# Patient Record
Sex: FEMALE | Race: WHITE
[De-identification: names, ages, dates, MRNs, and addresses within clinical notes are randomized per-mention and may not be internally consistent; named-entity substitution may affect disease eponyms.]

---

## 2017-10-10 ENCOUNTER — HOSPITAL ENCOUNTER (EMERGENCY)
Dept: HOSPITAL 45 - C.EDB | Age: 82
Discharge: HOME | End: 2017-10-10
Payer: COMMERCIAL

## 2017-10-10 VITALS — HEART RATE: 86 BPM | SYSTOLIC BLOOD PRESSURE: 156 MMHG | OXYGEN SATURATION: 98 % | DIASTOLIC BLOOD PRESSURE: 75 MMHG

## 2017-10-10 VITALS
HEIGHT: 60 IN | WEIGHT: 126.99 LBS | BODY MASS INDEX: 24.93 KG/M2 | HEIGHT: 60 IN | WEIGHT: 126.99 LBS | BODY MASS INDEX: 24.93 KG/M2

## 2017-10-10 VITALS — TEMPERATURE: 97.34 F

## 2017-10-10 DIAGNOSIS — I25.10: ICD-10-CM

## 2017-10-10 DIAGNOSIS — R10.31: ICD-10-CM

## 2017-10-10 DIAGNOSIS — F32.9: ICD-10-CM

## 2017-10-10 DIAGNOSIS — Z79.82: ICD-10-CM

## 2017-10-10 DIAGNOSIS — C91.11: ICD-10-CM

## 2017-10-10 DIAGNOSIS — M19.90: ICD-10-CM

## 2017-10-10 DIAGNOSIS — K40.90: Primary | ICD-10-CM

## 2017-10-10 LAB
ALP SERPL-CCNC: 115 U/L (ref 45–117)
ALT SERPL-CCNC: 18 U/L (ref 12–78)
ANION GAP SERPL CALC-SCNC: 7 MMOL/L (ref 3–11)
AST SERPL-CCNC: (no result) U/L (ref 15–37)
BASOPHILS # BLD: 0.08 K/UL (ref 0–0.2)
BASOPHILS NFR BLD: 0.2 %
BUN SERPL-MCNC: 16 MG/DL (ref 7–18)
BUN/CREAT SERPL: 12.2 (ref 10–20)
CALCIUM SERPL-MCNC: 9.4 MG/DL (ref 8.5–10.1)
CHLORIDE SERPL-SCNC: 106 MMOL/L (ref 98–107)
CKMB/CK RATIO: (no result) (ref 0–3)
CO2 SERPL-SCNC: 25 MMOL/L (ref 21–32)
COMPLETE: YES
CREAT CL PREDICTED SERPL C-G-VRATE: 24.7 ML/MIN
CREAT SERPL-MCNC: 1.3 MG/DL (ref 0.6–1.2)
EOSINOPHIL NFR BLD AUTO: 220 K/UL (ref 130–400)
GLUCOSE SERPL-MCNC: 83 MG/DL (ref 70–99)
HCT VFR BLD CALC: 41.3 % (ref 37–47)
IG%: 0.3 %
IMM GRANULOCYTES NFR BLD AUTO: 82.5 %
INR PPP: 1 (ref 0.9–1.1)
LYMPHOCYTES # BLD: 31.39 K/UL (ref 1.2–3.4)
MCH RBC QN AUTO: 29.5 PG (ref 25–34)
MCHC RBC AUTO-ENTMCNC: 32 G/DL (ref 32–36)
MCV RBC AUTO: 92.2 FL (ref 80–100)
MONOCYTES NFR BLD: 2.5 %
NEUTROPHILS # BLD AUTO: 1.5 %
NEUTROPHILS NFR BLD AUTO: 13 %
PARTIAL THROMBOPLASTIN RATIO: 0.9
PMV BLD AUTO: 9.2 FL (ref 7.4–10.4)
POTASSIUM SERPL-SCNC: (no result) MMOL/L (ref 3.5–5.1)
PROTHROMBIN TIME: 11.2 SECONDS (ref 9–12)
RBC # BLD AUTO: 4.48 M/UL (ref 4.2–5.4)
SMUDGE CELLS # BLD: PRESENT 10*3/UL
SODIUM SERPL-SCNC: 138 MMOL/L (ref 136–145)
WBC # BLD AUTO: 38.04 K/UL (ref 4.8–10.8)

## 2017-10-10 NOTE — EMERGENCY ROOM VISIT NOTE
History


Report prepared by Xuan:  Deyanira Russo


Under the Supervision of:  Dr. Sin Alfaro D.O.


First contact with patient:  19:26


Chief Complaint:  ABDOMINAL PAIN


Stated Complaint:  INGUINAL HERNIA


Nursing Triage Summary:  


pt dx with right inguinal hernia with bowel. last week saw dr do. had u/s 


yesterday. sent here for further eval





History of Present Illness


The patient is a 86 year old female who presents to the Emergency Room with 

complaints of a right inguinal hernia.  She reports that she saw Dr. Do 3 

days ago and was referred here. The patient states that she had an US done 

yesterday that showed bowel in the hernia.  The patient reports having nausea 

and back pain. The patient denies having diarrhea and vomiting. The patient 

reports having history of CLL, but denies a history of breast cancer. She 

reports taking medication for hypertension.





   Source of History:  patient


   Position:  other (right inguinal region)


   Quality:  other (hernia)


   Timing:  constant


   Associated Symptoms:  + nausea, + back pain, No vomiting, No diarrhea





Review of Systems


See HPI for pertinent positives & negatives. A total of 10 systems reviewed and 

were otherwise negative.





Past Medical & Surgical


Medical Problems:


(1) Acute Lyme disease


(2) Arthritis


(3) CAD (coronary artery disease)


(4) CLL (chronic lymphocytic leukemia)


(5) Degenerative disc disease


(6) Depression


(7) Non-allergic rhinitis


Surgical Problems:


(1) History of tonsillectomy








Family History





No pertinent family history





Social History


Smoking Status:  Never Smoker


Alcohol Use:  none


Drug Use:  none


Marital Status:  


Housing Status:  lives with significant other


Occupation Status:  retired





Current/Historical Medications


Scheduled


Aspirin (Aspirin), 81 MG PO QPM


Atorvastatin (Lipitor), 40 MG PO QPM


Azelastine Hcl (Astelin Nasal Council), 1 SPRAY JULIO BID


Bimatoprost (Lumigan), 1 DROP OPB HS


Calcium Carbonate-Vitamin D (Calcium 600 + D), 1 TABS PO QPM


Cholecalciferol (Vitamin D3), 1 TAB PO BID


Epoetin Enmanuel (Procrit), 1 ML INJ WK


Ferrous Sulfate (Ferrous Sulfate), 325 MG PO BID


Fluticasone Propionate (Nasal) (Flonase Allergy Relief), 2 SPRAYS JULIO DAILY 

AFTERNOON


Gabapentin (Neurontin), 300 MG PO HS


Lactobacillus-Inulin (Culturelle), 1 CAP PO QPM


Lamotrigine (Lamotrigine), 100 MG PO BID


Levothyroxine Sodium (Levothyroxine Sodium), 50 MCG PO QAM


Lisinopril (Zestril), 10 MG PO QPM


Lorazepam (Ativan), 0.25 MG PO HS


Metoprolol Succinate (Toprol Xl), 25 MG PO QPM


Mometasone Furoate-Formoterol (Dulera 100/5 Mcg), 2 PUFFS INH Q12


Omeprazole (Prilosec), 20 MG PO QPM


Quetiapine Fumarate (Seroquel), 25 MG PO HS


Venlafaxine Hcl (Effexor Xr), 75 MG PO QPM


Venlafaxine Hcl (Effexor Xr), 150 MG PO QPM





Scheduled PRN


Levalbuterol Tartrate (Xopenex Hfa), 2 PUFFS INH Q4H PRN for SOB/Wheezing/Cough


Nitroglycerin (Nitrostat), 0.4 MG UT UD PRN for Chest Pain





Allergies


Coded Allergies:  


     Adhesives (Verified  Allergy, Intermediate, if on for a while, 10/10/17)


     Bacitracin (Verified  Allergy, Mild, ALLERGY, 10/10/17)


     Neomycin (Verified  Allergy, Mild, ALLERGY, 10/10/17)


     Polymyxin B (Verified  Allergy, Mild, ALLERGY, 10/10/17)





Physical Exam


Vital Signs











  Date Time  Temp Pulse Resp B/P (MAP) Pulse Ox O2 Delivery O2 Flow Rate FiO2


 


10/10/17 23:06  86 18 156/75 98 Room Air  


 


10/10/17 21:16  79 18 151/72 97 Room Air  


 


10/10/17 18:12 36.3 109 18 151/76 99 Room Air  











Physical Exam


GENERAL:  Patient is awake, alert, and in no acute distress. Patient is resting 

comfortably and showing no signs of anxiety


EYES: The conjunctivae are clear.  The pupils are round and reactive. 


EARS, NOSE, MOUTH AND THROAT: The nose is without any evidence of any 

deformity. Mucous membranes are moist tongue is midline 


NECK: The neck is nontender and supple.


RESPIRATORY: Normal respiratory effort is noted there is no evidence of 

wheezing rhonchi or rales


CARDIOVASCULAR:  Regular rate and rhythm noted there no murmurs rubs or gallops 

normal S1 normal S2 


GASTROINTESTINAL: Abdomen mildly distended with right lower quadrant tenderness 

to palpation. No guarding or rigidity. No definite inguinal mass noted with 

tenderness to palpation. 


BACK:  No midline tenderness or or step-off noted range of motion in flexion 

extension as well as rotation no signs of muscle spasm noted


MUSCULOSKELETAL/EXTREMITIES: There is no evidence of gross deformity full range 

of motion is noted in the hips and shoulders


SKIN: There is no obvious evidence of any rash. There are no petechiae, pallor 

or cyanosis noted. 


NEUROLOGIC:  Patient is awake alert and oriented x3





Medical Decision & Procedures


ER Provider


Diagnostic Interpretation:


Radiology results as stated below per my review and radiologist interpretation:








CHEST ONE VIEW PORTABLE





CLINICAL HISTORY: Pain, radiating to the abdomen    





COMPARISON STUDY:  7/4/2016





FINDINGS: The cardiac and sternal contours remain stable. There is mild


elevation/eventration right hemidiaphragm unchanged the prior study. There is no


focal pulmonary consolidation. There is no failure. There are no pleural


effusions. There is minor thickening of interstitial markings unchanged the


prior study. There is no free intraperitoneal air.[ 





IMPRESSION: No active disease in the chest.











Electronically signed by:  Joshua Medina M.D.


10/10/2017 8:08 PM





Dictated Date/Time:  10/10/2017 8:07 PM








CT SCAN OF THE ABDOMEN AND PELVIS WITHOUT CONTRAST





CLINICAL HISTORY: right flank pain    





COMPARISON STUDY:  7/14/2010 





TECHNIQUE: CT scan of the abdomen and pelvis was performed from the lung bases


to the proximal femurs. Images are reviewed in the axial, sagittal, and coronal


planes. IV contrast was not administered for this examination.  A dose lowering


technique was utilized adhering to the principles of ALARA.








CT DOSE: 252.89 mGy.cm


FINDINGS:





Lower chest: There is a moderate hiatal hernia, with a para esophageal


component. There is bilateral subpleural reticulation. There is no lobar


consolidation.





Liver: The unenhanced liver is normal in size, contour, and attenuation. There


is no intrahepatic biliary ductal dilatation.





Gallbladder: Mildly distended. No calculi identified.





Spleen: Normal in size and attenuation.





Pancreas: Unremarkable.





Adrenal glands: Unremarkable.





Kidneys: No renal, ureteral, or bladder calculi are visualized.





Bowel: There are no transition zones indicate bowel obstruction. There is no


acute diverticulitis. There is a right inguinal hernia which contains small


bowel loops. This is not currently resulting in obstruction.





Peritoneum: There is no intraperitoneal free air or abdominal ascites.





Vasculature: The abdominal aorta is normal in course and caliber.





Adenopathy: None.





Pelvic viscera: There is an indwelling pessary.





Skeletal structures: No destructive osseous lesions are seen.





IMPRESSION:  


1. No evidence of bowel obstruction. No evidence of free air


2. Containing right inguinal hernia


3. Mild gallbladder distention. No calculi visualized on CT scanning


4. Hiatal hernia with a para esophageal component


5. No renal, ureteral, or bladder calculi identified.


6. Normal appendix











Electronically signed by:  Joshua Medina M.D.


10/10/2017 8:40 PM





Dictated Date/Time:  10/10/2017 8:36 PM





Laboratory Results


10/10/17 21:02








Red Blood Count 4.48, Mean Corpuscular Volume 92.2, Mean Corpuscular Hemoglobin 

29.5, Mean Corpuscular Hemoglobin Concent 32.0, Mean Platelet Volume 9.2, 

Neutrophils (%) (Auto) 13.0, Lymphocytes (%) (Auto) 82.5, Monocytes (%) (Auto) 

2.5, Eosinophils (%) (Auto) 1.5, Basophils (%) (Auto) 0.2, Neutrophils # (Auto) 

4.93, Lymphocytes # (Auto) 31.39, Monocytes # (Auto) 0.97, Eosinophils # (Auto) 

0.57, Basophils # (Auto) 0.08





10/10/17 21:02

















Test


  10/10/17


21:02


 


White Blood Count


  38.04 K/uL


(4.8-10.8)


 


Red Blood Count


  4.48 M/uL


(4.2-5.4)


 


Hemoglobin


  13.2 g/dL


(12.0-16.0)


 


Hematocrit 41.3 % (37-47) 


 


Mean Corpuscular Volume


  92.2 fL


()


 


Mean Corpuscular Hemoglobin


  29.5 pg


(25-34)


 


Mean Corpuscular Hemoglobin


Concent 32.0 g/dl


(32-36)


 


Platelet Count


  220 K/uL


(130-400)


 


Mean Platelet Volume


  9.2 fL


(7.4-10.4)


 


Neutrophils (%) (Auto) 13.0 % 


 


Lymphocytes (%) (Auto) 82.5 % 


 


Monocytes (%) (Auto) 2.5 % 


 


Eosinophils (%) (Auto) 1.5 % 


 


Basophils (%) (Auto) 0.2 % 


 


Neutrophils # (Auto)


  4.93 K/uL


(1.4-6.5)


 


Lymphocytes # (Auto)


  31.39 K/uL


(1.2-3.4)


 


Monocytes # (Auto)


  0.97 K/uL


(0.11-0.59)


 


Eosinophils # (Auto)


  0.57 K/uL


(0-0.5)


 


Basophils # (Auto)


  0.08 K/uL


(0-0.2)


 


RDW Standard Deviation


  45.5 fL


(36.4-46.3)


 


RDW Coefficient of Variation


  13.6 %


(11.5-14.5)


 


Immature Granulocyte % (Auto) 0.3 % 


 


Immature Granulocyte # (Auto)


  0.10 K/uL


(0.00-0.02)


 


Smudge Cells PRESENT 


 


Prothrombin Time


  11.2 SECONDS


(9.0-12.0)


 


Prothromb Time International


Ratio 1.0 (0.9-1.1) 


 


 


Activated Partial


Thromboplast Time 22.6 SECONDS


(21.0-31.0)


 


Partial Thromboplastin Ratio 0.9 


 


Anion Gap


  7.0 mmol/L


(3-11)


 


Est Creatinine Clear Calc


Drug Dose 24.7 ml/min 


 


 


Estimated GFR (


American) 43.0 


 


 


Estimated GFR (Non-


American 37.1 


 


 


BUN/Creatinine Ratio 12.2 (10-20) 


 


Calcium Level


  9.4 mg/dl


(8.5-10.1)


 


Total Bilirubin


  0.5 mg/dl


(0.2-1)


 


Direct Bilirubin  mg/dl (0-0.2) 


 


Aspartate Amino Transf


(AST/SGOT)  U/L (15-37) 


 


 


Alanine Aminotransferase


(ALT/SGPT) 18 U/L (12-78) 


 


 


Alkaline Phosphatase


  115 U/L


()


 


Total Creatine Kinase  U/L () 


 


Creatine Kinase MB


  1.7 ng/ml


(0.5-3.6)


 


Creatine Kinase MB Ratio  (0-3.0) 


 


Troponin I


  < 0.015 ng/ml


(0-0.045)


 


Total Protein


  7.5 gm/dl


(6.4-8.2)


 


Albumin


  3.8 gm/dl


(3.4-5.0)


 


Lipase


  142 U/L


()





Laboratory results per my review.





Medications Administered











 Medications


  (Trade)  Dose


 Ordered  Sig/Debi


 Route  Start Time


 Stop Time Status Last Admin


Dose Admin


 


 Sodium Chloride  1,000 ml @ 


 999 mls/hr  Q1H1M STAT


 IV  10/10/17 19:33


 10/10/17 20:33 DC 10/10/17 21:12


999 MLS/HR


 


 Ondansetron HCl


  (Zofran Inj)  4 mg  NOW  STAT


 IV  10/10/17 19:33


 10/10/17 19:34 DC 10/10/17 21:11


4 MG











ECG


Indication:  abdominal pain


Rate (beats per minute):  79


Rhythm:  normal sinus


Findings:  no ectopy, other (no ST segment abnormalities)


Change:  no significant change (from July 3, 2016)





ED Course


1927: The patient was evaluated in room A3. A complete history and physical 

examination were performed. 





1933: Ordered Zofran Inj 4 mg IV, Sodium Chloride 1,000 ml @ 999 mls/hr IV. 





2125: I discussed the patient's case with Dr. Tavarez. The patient will be 

evaluated for further management. 





2240: I updated the patient on her results. 





2250: Upon reevaluation, the patient is resting. I discussed the results and 

treatment plan with her. She verbalized agreement of the treatment plan. She 

was discharged home.





Medical Decision





Differential diagnosis:


Etiologies such as appendicitis, diverticulitis, PUD, biliary pathology, UTI, 

pancreatitis, obstruction, mesenteric ischemia, aortic pathology, infections, 

inflammatory bowel disease, renal colic, as well as others were entertained. 





Nursing notes reviewed.





The patient is an 86-year-old female who presented to the emergency department 

for an evaluation of right lower abdominal pain. The patient had reproducible 

right lower quadrant abdominal pain. She has a history of a right inguinal 

hernia which was noted but did not appear to be incarcerated. The patient's CAT 

scan only showed signs of the hernia. It did not show any definite signs of 

appendicitis or any other intra-abdominal pathology. I discussed the patient's 

laboratory radiographic studies with her. She was found have an elevated white 

blood cell count but this is likely more consistent with her underlying 

leukemia. I discussed his case with the on-call general surgeon. He has agreed 

to evaluate the patient in the emergency department and felt that she could be 

managed as an outpatient and I agree. The patient was encouraged to avoid any 

strenuous activity or heavy lifting. She was encouraged to call the general 

surgeon in the morning to schedule a follow-up appointment. She was also 

encouraged return to the emergency department immediately if symptoms change 

worsen or the need arises.





Medication Reconcilliation


Current Medication List:  was personally reviewed by me





Blood Pressure Screening


Patient's blood pressure:  Elevated blood pressure


Blood pressure disposition:  Elevated BP felt to be situational





Consults


Time Called:  2115


Consulting Physician:  Dr. Urbina


Returned Call:  2125


I discussed the patient's case with Dr. Tavarez. The patient will be evaluated 

for further management.





Impression





 Primary Impression:  


 Right inguinal hernia


 Additional Impression:  


 Abdominal pain





Scribe Attestation


The scribe's documentation has been prepared under my direction and personally 

reviewed by me in its entirety. I confirm that the note above accurately 

reflects all work, treatment, procedures, and medical decision making performed 

by me.





Departure Information


Dispostion


Home / Self-Care





Referrals


Germán Do D.O. (PCP)








Alvin Snow MD





Forms


HOME CARE DOCUMENTATION FORM,                                                 

               IMPORTANT VISIT INFORMATION





Patient Instructions


ED Hernia Inguinal, [facility]





Additional Instructions





Call the surgeon in the morning to schedule a follow-up appointment. Avoid any 

strenuous activity or heavy lifting. Return to the emergency department 

immediately if symptoms change worsen or the need arises.





Problem Qualifiers








 Additional Impression:  


 Abdominal pain


 Abdominal location:  right lower quadrant  Qualified Codes:  R10.31 - Right 

lower quadrant pain

## 2017-10-10 NOTE — DIAGNOSTIC IMAGING REPORT
CT SCAN OF THE ABDOMEN AND PELVIS WITHOUT CONTRAST



CLINICAL HISTORY: right flank pain    



COMPARISON STUDY:  7/14/2010 



TECHNIQUE: CT scan of the abdomen and pelvis was performed from the lung bases

to the proximal femurs. Images are reviewed in the axial, sagittal, and coronal

planes. IV contrast was not administered for this examination.  A dose lowering

technique was utilized adhering to the principles of ALARA.





CT DOSE: 252.89 mGy.cm

FINDINGS:



Lower chest: There is a moderate hiatal hernia, with a para esophageal

component. There is bilateral subpleural reticulation. There is no lobar

consolidation.



Liver: The unenhanced liver is normal in size, contour, and attenuation. There

is no intrahepatic biliary ductal dilatation.



Gallbladder: Mildly distended. No calculi identified.



Spleen: Normal in size and attenuation.



Pancreas: Unremarkable.



Adrenal glands: Unremarkable.



Kidneys: No renal, ureteral, or bladder calculi are visualized.



Bowel: There are no transition zones indicate bowel obstruction. There is no

acute diverticulitis. There is a right inguinal hernia which contains small

bowel loops. This is not currently resulting in obstruction.



Peritoneum: There is no intraperitoneal free air or abdominal ascites.



Vasculature: The abdominal aorta is normal in course and caliber.



Adenopathy: None.



Pelvic viscera: There is an indwelling pessary.



Skeletal structures: No destructive osseous lesions are seen.



IMPRESSION:  

1. No evidence of bowel obstruction. No evidence of free air

2. Containing right inguinal hernia

3. Mild gallbladder distention. No calculi visualized on CT scanning

4. Hiatal hernia with a para esophageal component

5. No renal, ureteral, or bladder calculi identified.

6. Normal appendix







Electronically signed by:  Joshua Medina M.D.

10/10/2017 8:40 PM



Dictated Date/Time:  10/10/2017 8:36 PM

## 2017-10-11 NOTE — SURGERY CONSULTATION
Consultation


Date of Consultation:


Oct 11, 2017.


Attending Physician:





History of Present Illness


The patient is a 86 year old female who presents to the Emergency Room with 

complaints of a right inguinal hernia.  She reports that she saw Dr. Do 3 

days ago and was referred here. The patient states that she had an US done 

yesterday that showed bowel in the hernia.  The patient reports having nausea 

and back pain. The patient denies having diarrhea and vomiting. The patient 

reports having history of CLL, but denies a history of breast cancer. She 

reports taking medication for hypertension.


I saw pt at ER, now, pt denies any pain on right inguinal area, no bulging, 

most likely the hernia is reducible, pt wants to go home,





Past Medical/Surgical History


Medical Problems:


(1) Abdominal pain


Status: Acute  





(2) Asthma exacerbation


Status: Acute  





(3) Bronchitis


Status: Acute  





(4) Right inguinal hernia


Status: Acute  





(5) Sepsis


Status: Acute  











Family History





No pertinent family history





Social History


Smoking Status:  Never Smoker


Smokeless Tobacco Use:  No


Alcohol Use:  occasionally


Drug Use:  none


Marital Status:  


Housing Status:  lives with significant other


Occupation Status:  retired





Allergies


Coded Allergies:  


     Adhesives (Verified  Allergy, Intermediate, if on for a while, 10/10/17)


     Bacitracin (Verified  Allergy, Mild, ALLERGY, 10/10/17)


     Neomycin (Verified  Allergy, Mild, ALLERGY, 10/10/17)


     Polymyxin B (Verified  Allergy, Mild, ALLERGY, 10/10/17)





Home Medications


Scheduled


Aspirin (Aspirin), 81 MG PO QPM


Atorvastatin (Lipitor), 40 MG PO QPM


Azelastine Hcl (Astelin Nasal Swain), 1 SPRAY JULIO BID


Bimatoprost (Lumigan), 1 DROP OPB HS


Calcium Carbonate-Vitamin D (Calcium 600 + D), 1 TABS PO QPM


Cholecalciferol (Vitamin D3), 1 TAB PO BID


Epoetin Enmanuel (Procrit), 1 ML INJ WK


Ferrous Sulfate (Ferrous Sulfate), 325 MG PO BID


Fluticasone Propionate (Nasal) (Flonase Allergy Relief), 2 SPRAYS JULIO DAILY 

AFTERNOON


Gabapentin (Neurontin), 300 MG PO HS


Lactobacillus-Inulin (Culturelle), 1 CAP PO QPM


Lamotrigine (Lamotrigine), 100 MG PO BID


Levothyroxine Sodium (Levothyroxine Sodium), 50 MCG PO QAM


Lisinopril (Zestril), 10 MG PO QPM


Lorazepam (Ativan), 0.25 MG PO HS


Metoprolol Succinate (Toprol Xl), 25 MG PO QPM


Mometasone Furoate-Formoterol (Dulera 100/5 Mcg), 2 PUFFS INH Q12


Omeprazole (Prilosec), 20 MG PO QPM


Quetiapine Fumarate (Seroquel), 25 MG PO HS


Venlafaxine Hcl (Effexor Xr), 75 MG PO QPM


Venlafaxine Hcl (Effexor Xr), 150 MG PO QPM





Scheduled PRN


Levalbuterol Tartrate (Xopenex Hfa), 2 PUFFS INH Q4H PRN for SOB/Wheezing/Cough


Nitroglycerin (Nitrostat), 0.4 MG UT UD PRN for Chest Pain





Review of Systems


Constitutional:  No fever, No chills, No sweats, No weight loss, No weakness, 

No fatigue, No problem reported


Eyes:  No worsening of vision, No eye pain, No redness, No discharge, No 

diplopia, No problem reported


ENT:  No hearing loss, No unusual epistaxis, No nasal symptoms, No sore throat, 

No tinnitus, No dental problems, No trouble swallowing, No problem reported


Respiratory:  No cough, No sputum, No wheezing, No shortness of breath, No 

dyspnea on exertion, No dyspnea at rest, No hemoptysis, No problem reported


Cardiovascular:  No chest pain, No orthopnea, No PND, No edema, No claudication

, No palpitations, No problem reported


Abdomen:  No pain, No nausea, No vomiting, No diarrhea, No constipation, No GI 

bleeding, No problem reported


Neurologic:  No memory loss, No paralysis, No weakness, No numbness/tingling, 

No vertigo, No balance problems, No problem reported


Psychiatric:  No depression symptoms, No anhedonism, No anxiety, No insomnia, 

No substance abuse, No problem reported


Endocrine:  No fatigue, No excessive thirst, No excessive urination, No problem 

reported


Hematologic / Lymphatic:  No abnormal bleeding/bruising, No clotting problems, 

No swollen lymph nodes, No night sweats, No problem reported





Physical Exam











  Date Time  Temp Pulse Resp B/P (MAP) Pulse Ox O2 Delivery O2 Flow Rate FiO2


 


10/10/17 23:06  86 18 156/75 98 Room Air  


 


10/10/17 21:16  79 18 151/72 97 Room Air  


 


10/10/17 18:12 36.3 109 18 151/76 99 Room Air  








General Appearance:  WD/WN, no apparent distress


Head:  normocephalic


Eyes:  normal inspection


ENT:  normal ENT inspection


Neck:  supple, no JVD


Respiratory/Chest:  chest non-tender, lungs clear


Cardiovascular:  regular rate, rhythm, no edema, no gallop, no JVD, no murmur


Abdomen/GI:  soft, no organomegaly (no mass on right inguinal area, ), no 

pulsatile mass


Extremities/Musculoskelatal:  normal inspection, no calf tenderness, normal 

capillary refill


Neurologic/Psych:  no motor/sensory deficits, alert, normal mood/affect


Skin:  normal color, warm/dry





Laboratory Results





Last 24 Hours








Test


  10/10/17


21:02


 


White Blood Count 38.04 K/uL 


 


Red Blood Count 4.48 M/uL 


 


Hemoglobin 13.2 g/dL 


 


Hematocrit 41.3 % 


 


Mean Corpuscular Volume 92.2 fL 


 


Mean Corpuscular Hemoglobin 29.5 pg 


 


Mean Corpuscular Hemoglobin


Concent 32.0 g/dl 


 


 


Platelet Count 220 K/uL 


 


Mean Platelet Volume 9.2 fL 


 


Neutrophils (%) (Auto) 13.0 % 


 


Lymphocytes (%) (Auto) 82.5 % 


 


Monocytes (%) (Auto) 2.5 % 


 


Eosinophils (%) (Auto) 1.5 % 


 


Basophils (%) (Auto) 0.2 % 


 


Neutrophils # (Auto) 4.93 K/uL 


 


Lymphocytes # (Auto) 31.39 K/uL 


 


Monocytes # (Auto) 0.97 K/uL 


 


Eosinophils # (Auto) 0.57 K/uL 


 


Basophils # (Auto) 0.08 K/uL 


 


RDW Standard Deviation 45.5 fL 


 


RDW Coefficient of Variation 13.6 % 


 


Immature Granulocyte % (Auto) 0.3 % 


 


Immature Granulocyte # (Auto) 0.10 K/uL 


 


Smudge Cells PRESENT 


 


Prothrombin Time 11.2 SECONDS 


 


Prothromb Time International


Ratio 1.0 


 


 


Activated Partial


Thromboplast Time 22.6 SECONDS 


 


 


Partial Thromboplastin Ratio 0.9 


 


Sodium Level 138 mmol/L 


 


Potassium Level  mmol/L 


 


Chloride Level 106 mmol/L 


 


Carbon Dioxide Level 25 mmol/L 


 


Anion Gap 7.0 mmol/L 


 


Blood Urea Nitrogen 16 mg/dl 


 


Creatinine 1.30 mg/dl 


 


Est Creatinine Clear Calc


Drug Dose 24.7 ml/min 


 


 


Estimated GFR (


American) 43.0 


 


 


Estimated GFR (Non-


American 37.1 


 


 


BUN/Creatinine Ratio 12.2 


 


Random Glucose 83 mg/dl 


 


Calcium Level 9.4 mg/dl 


 


Total Bilirubin 0.5 mg/dl 


 


Direct Bilirubin  mg/dl 


 


Aspartate Amino Transf


(AST/SGOT)  U/L 


 


 


Alanine Aminotransferase


(ALT/SGPT) 18 U/L 


 


 


Alkaline Phosphatase 115 U/L 


 


Total Creatine Kinase  U/L 


 


Creatine Kinase MB 1.7 ng/ml 


 


Creatine Kinase MB Ratio  


 


Troponin I < 0.015 ng/ml 


 


Total Protein 7.5 gm/dl 


 


Albumin 3.8 gm/dl 


 


Lipase 142 U/L 











Assessment & Plan


Assement: pt is a 86 year old female who presents to ER with right inguinal pain

, now pt has no right inguinal pain, 





IMP: right inguinal hernia, 





I recommend that, pt can be discharged home today, pt will see me next week for 

elective hernia repair, I instructed pt , she should come to ER if she develops 

abdominal pain, nausea. vomiting, pt understood, I answered all questions, D/W 

ER attending,

## 2017-10-17 ENCOUNTER — HOSPITAL ENCOUNTER (OUTPATIENT)
Dept: HOSPITAL 45 - C.ACU | Age: 82
Discharge: HOME | End: 2017-10-17
Attending: SURGERY
Payer: COMMERCIAL

## 2017-10-17 VITALS
TEMPERATURE: 98.06 F | SYSTOLIC BLOOD PRESSURE: 127 MMHG | DIASTOLIC BLOOD PRESSURE: 59 MMHG | OXYGEN SATURATION: 97 % | HEART RATE: 54 BPM

## 2017-10-17 VITALS
HEART RATE: 71 BPM | TEMPERATURE: 98.24 F | SYSTOLIC BLOOD PRESSURE: 127 MMHG | DIASTOLIC BLOOD PRESSURE: 61 MMHG | OXYGEN SATURATION: 98 %

## 2017-10-17 VITALS
BODY MASS INDEX: 26.26 KG/M2 | WEIGHT: 130.27 LBS | HEIGHT: 59.02 IN | BODY MASS INDEX: 26.26 KG/M2 | WEIGHT: 130.27 LBS | HEIGHT: 59.02 IN | BODY MASS INDEX: 26.26 KG/M2

## 2017-10-17 VITALS
OXYGEN SATURATION: 97 % | TEMPERATURE: 98.24 F | DIASTOLIC BLOOD PRESSURE: 56 MMHG | SYSTOLIC BLOOD PRESSURE: 117 MMHG | HEART RATE: 68 BPM

## 2017-10-17 VITALS — SYSTOLIC BLOOD PRESSURE: 135 MMHG | DIASTOLIC BLOOD PRESSURE: 65 MMHG | OXYGEN SATURATION: 97 % | HEART RATE: 68 BPM

## 2017-10-17 DIAGNOSIS — N18.3: ICD-10-CM

## 2017-10-17 DIAGNOSIS — F32.9: ICD-10-CM

## 2017-10-17 DIAGNOSIS — K40.90: Primary | ICD-10-CM

## 2017-10-17 DIAGNOSIS — I25.10: ICD-10-CM

## 2017-10-17 DIAGNOSIS — M15.9: ICD-10-CM

## 2017-10-17 DIAGNOSIS — E03.9: ICD-10-CM

## 2017-10-17 DIAGNOSIS — I12.9: ICD-10-CM

## 2017-10-17 DIAGNOSIS — Z82.49: ICD-10-CM

## 2017-10-17 DIAGNOSIS — J45.30: ICD-10-CM

## 2017-10-17 DIAGNOSIS — K21.9: ICD-10-CM

## 2017-10-17 DIAGNOSIS — E78.5: ICD-10-CM

## 2017-10-17 DIAGNOSIS — C91.10: ICD-10-CM

## 2017-10-17 DIAGNOSIS — N62: ICD-10-CM

## 2017-10-17 DIAGNOSIS — M76.899: ICD-10-CM

## 2017-10-17 RX ADMIN — FENTANYL CITRATE PRN MCG: 50 INJECTION, SOLUTION INTRAMUSCULAR; INTRAVENOUS at 10:53

## 2017-10-17 RX ADMIN — FENTANYL CITRATE PRN MCG: 50 INJECTION, SOLUTION INTRAMUSCULAR; INTRAVENOUS at 11:00

## 2017-10-17 NOTE — DISCHARGE INSTRUCTIONS
Discharge Instructions


Date of Service


Oct 17, 2017.





Admission


Reason for Admission:  Right Inguinal Hernia





Discharge


Discharge Diagnosis / Problem:  same





Discharge Goals


Goal(s):  Decrease discomfort, Improve function





Activity Recommendations


Activity Limitations:  as noted below


No heavy lifting over 10 pounds for 4-6 weeks


no strenuous activity until cleared by surgeon


No submerging incision underwater for 2 weeks or until completely healed (no 

bathing, swimming, or hot tubs)


No driving while taking narcotic pain medication or until you are pain free


.





Instructions / Follow-Up


Instructions / Follow-Up


You may shower in 4 days, sponge bath and wash hair in meantime.  You can let 

shower water hit your back but keep the dressing dry and clean.


Remove dressing in 4 days and then shower


Please keep a dressing on the incision as we did not use steri strips or 

surgical glue because of your adhesive allergy.  


Walking and light activity is encouraged to prevent blood clots from forming


Follow-up with Dr. Snow in 1 week, please call office at [phone] if you do 

not already have an appointment





Current Hospital Diet


Patient's current hospital diet:





Discharge Diet


Recommended Diet:  Regular Diet





Procedures


Procedures Performed:  


Right Open Inguinal Hernia Repair with Mesh





Pending Studies


Studies pending at discharge:  no





Medical Emergencies








.


Who to Call and When:





Medical Emergencies:  If at any time you feel your situation is an emergency, 

please call 911 immediately.





.





Non-Emergent Contact


Non-Emergency issues call your:  Primary Care Provider, Surgeon


Call Non-Emergent contact if:  you have a fever, temperature is above 101.5, 

your pain is not controlled, your pain is worsening, your pain is unusual for 

you, wound has increased drainage, wound has increased redness, wound has 

increased pain


.








"Provider Documentation" section prepared by Charlee Berman.








.





VTE Core Measure


Inpt VTE Proph given/why not?:  SCD's





[place] Drug Monitoring Program


Search Results:  patient reviewed within database, no issues identified

## 2017-10-17 NOTE — OPERATIVE REPORT
DATE OF OPERATION:  10/17/2017

 

PREOPERATIVE DIAGNOSIS:  Symptomatic right inguinal hernia.

 

POSTOPERATIVE DIAGNOSIS:  Same.

 

OPERATION:  Open repair, right inguinal hernia with mesh.

 

SURGEON:  Alvin Snow M.D.

 

FIRST ASSISTANT:  Charlee Berman PA-C. 

 

ANESTHESIA:  Conscious sedation plus local.

 

ESTIMATED BLOOD LOSS:  About 5 mL.

 

IV FLUIDS:  800 mL.

 

FINDINGS:  Right direct inguinal hernia.

 

COMPLICATIONS:  None.

 

INDICATIONS FOR THE PROCEDURE:  This is an 86-year-old female who presented

symptomatic right inguinal hernia and patient was diagnosed with right

inguinal hernia and patient will be required to do open repair, right

inguinal hernia with mesh.  I did talk to the patient and the patient's

 about the benefit and risk, alternate procedure.  I indicated the

risks may include but not limited such as bleeding, infection, hernia

recurrence, chronic  incision pain, myocardial infarction, DVT, stroke, even

death.  The patient understands.  She signed informed consent and I answered

all questions.

 

DETAILS OF PROCEDURE:  We brought the patient to the OR, put the patient in

the supine position.  The patient received SCD on bilateral legs to prevent

DVT.  Also, patient received 2 grams Ancef IV for prophylactic antibiotic. 

The patient received conscious sedation by the anesthesiology.  The right

lower abdomen was prepped and draped in routine sterile fashion.  After time

out, I injected the local anesthesia by using 1% lidocaine mixed with 0.5%

Marcaine around the right inguinal area and then I made about a 4 cm incision

on the right inguinal area,  opened the skin and opened the subcutaneous

layer and opened the external oblique fascia and mobilized the round

ligament.  Then we found the patient had a direct hernia.  We mobilized the

hernia sac and reduced the hernia sac to the abdominal cavity.  Then I used a

large plug blockage the  hernia sac, then I used 2-0 Prolene to fix the plug

on the abdominal wall.  Then I chose  3 x 5 cm Prolene mesh to reinforce the

posterior wall.  I used   2-0 Prolene suture mesh to the right inguinal

ligament and another 2-0 Prolene suture mesh to the conjoined tendon, 2

sutures meeting together, tied and also we identified the ilioinguinal nerves

and iliohypogastric nerves all times to protection nerve at all times. 

Hemostasis obtained then we closed.  Once we put the mesh in  we checked the

mesh and seated  nicely, no tension.  Then I closed the external oblique

fascia by using 2-0 Vicryl continuous running, closed subcutaneous layer by

using 2-0 Vicryl continuous running, closed skin by using 4-0 Vicryl

continuous running.  We put the dressing on.  The patient tolerated the

procedure well.  All the instrument, needle and sponge count correct x2 at

the end of case.  The patient transferred to recovery room in stable

condition.  After the procedure, I did talk to the patient and family member

about OR findings and procedure we did.  Also, before and after the

procedure, I gave patient and patient's family member about the postop care

instruction, they understand.

 

 

I attest to the content of the Intraoperative Record and any orders documented 
therein. Any exceptions are noted below.

 

 

 

MTDD

## 2017-10-17 NOTE — [facility] POST OPERATIVE BRIEF NOTE
Immediate Operative Summary


Operative Date


Oct 17, 2017.





Pre-Operative Diagnosis





Symptomatic Right Inguinal Hernia





Post-Operative Diagnosis





Symptomatic Right Inguinal Hernia





Procedure(s) Performed





Right Open Inguinal Hernia Repair with Mesh





Surgeon


Dr. LEELA Snow





Assistant Surgeon(s)


VIVEK Berman PA-C





Estimated Blood Loss


5mL





Findings


right direct inguinal hernia





Fluids (cc crystalloids)


800ml





Specimens





none per surgeon





Drains


none





Anesthesia


sedation + local





Complication(s)


None





Disposition


Recovery Room / PACU

## 2017-10-17 NOTE — ANESTHESIOLOGY PROGRESS NOTE
Anesthesia Post Op Note


Date & Time


Oct 17, 2017 at 11:13





Vital Signs


Pain Intensity:  2





Vital Signs Past 12 Hours








  Date Time  Temp Pulse Resp B/P (MAP) Pulse Ox O2 Delivery O2 Flow Rate FiO2


 


10/17/17 11:05 37.1 71 14 115/51 95 Room Air  


 


10/17/17 10:55  71 16 133/54 96 Room Air  


 


10/17/17 10:45  71 16 136/44 97 Oxymask 10 


 


10/17/17 10:35  71 16 140/56 100 Oxymask 10 


 


10/17/17 10:25 36.7 71 16 134/61 100 Oxymask 10 


 


10/17/17 08:32 36.7 54 20 127/59 (81) 97 Room Air  











Notes


Mental Status:  alert / awake / arousable, participated in evaluation


Pt Amnestic to Procedure:  Yes


Nausea / Vomiting:  adequately controlled


Pain:  adequately controlled


Airway Patency, RR, SpO2:  stable & adequate


BP & HR:  stable & adequate


Hydration State:  stable & adequate


Anesthetic Complications:  no major complications apparent

## 2017-11-19 ENCOUNTER — HOSPITAL ENCOUNTER (INPATIENT)
Dept: HOSPITAL 45 - C.EDA | Age: 82
LOS: 2 days | Discharge: HOME | DRG: 690 | End: 2017-11-21
Attending: HOSPITALIST | Admitting: FAMILY MEDICINE
Payer: COMMERCIAL

## 2017-11-19 VITALS
WEIGHT: 124.56 LBS | HEIGHT: 59 IN | BODY MASS INDEX: 25.11 KG/M2 | HEIGHT: 59 IN | WEIGHT: 124.56 LBS | BODY MASS INDEX: 25.11 KG/M2

## 2017-11-19 DIAGNOSIS — C91.10: ICD-10-CM

## 2017-11-19 DIAGNOSIS — I25.10: ICD-10-CM

## 2017-11-19 DIAGNOSIS — E03.9: ICD-10-CM

## 2017-11-19 DIAGNOSIS — Z79.82: ICD-10-CM

## 2017-11-19 DIAGNOSIS — I12.9: ICD-10-CM

## 2017-11-19 DIAGNOSIS — F32.9: ICD-10-CM

## 2017-11-19 DIAGNOSIS — Z66: ICD-10-CM

## 2017-11-19 DIAGNOSIS — N18.3: ICD-10-CM

## 2017-11-19 DIAGNOSIS — N39.0: Primary | ICD-10-CM

## 2017-11-19 LAB
ALP SERPL-CCNC: 83 U/L (ref 45–117)
ALT SERPL-CCNC: 24 U/L (ref 12–78)
ANION GAP SERPL CALC-SCNC: 10 MMOL/L (ref 3–11)
APPEARANCE UR: CLEAR
AST SERPL-CCNC: 20 U/L (ref 15–37)
BASOPHILS # BLD: 0.09 K/UL (ref 0–0.2)
BASOPHILS NFR BLD: 0.3 %
BILIRUB UR-MCNC: (no result) MG/DL
BUN SERPL-MCNC: 14 MG/DL (ref 7–18)
BUN/CREAT SERPL: 13.2 (ref 10–20)
CALCIUM SERPL-MCNC: 9.1 MG/DL (ref 8.5–10.1)
CHLORIDE SERPL-SCNC: 103 MMOL/L (ref 98–107)
CO2 SERPL-SCNC: 24 MMOL/L (ref 21–32)
COLOR UR: YELLOW
COMPLETE: YES
CREAT CL PREDICTED SERPL C-G-VRATE: 30 ML/MIN
CREAT SERPL-MCNC: 1.04 MG/DL (ref 0.6–1.2)
EOSINOPHIL NFR BLD AUTO: 189 K/UL (ref 130–400)
GLUCOSE SERPL-MCNC: 91 MG/DL (ref 70–99)
HCT VFR BLD CALC: 38.2 % (ref 37–47)
IG%: 0.2 %
IMM GRANULOCYTES NFR BLD AUTO: 80.1 %
INR PPP: 1.1 (ref 0.9–1.1)
LYMPHOCYTES # BLD: 25.7 K/UL (ref 1.2–3.4)
MANUAL MICROSCOPIC REQUIRED?: NO
MCH RBC QN AUTO: 29.3 PG (ref 25–34)
MCHC RBC AUTO-ENTMCNC: 31.7 G/DL (ref 32–36)
MCV RBC AUTO: 92.5 FL (ref 80–100)
MONOCYTES NFR BLD: 4.3 %
NEUTROPHILS # BLD AUTO: 2.2 %
NEUTROPHILS NFR BLD AUTO: 12.9 %
NITRITE UR QL STRIP: (no result)
PARTIAL THROMBOPLASTIN RATIO: 0.9
PH UR STRIP: 8 [PH] (ref 4.5–7.5)
PMV BLD AUTO: 9.9 FL (ref 7.4–10.4)
POTASSIUM SERPL-SCNC: 4 MMOL/L (ref 3.5–5.1)
PROTHROMBIN TIME: 11.3 SECONDS (ref 9–12)
RBC # BLD AUTO: 4.13 M/UL (ref 4.2–5.4)
REVIEW REQ?: NO
SMUDGE CELLS # BLD: PRESENT 10*3/UL
SODIUM SERPL-SCNC: 138 MMOL/L (ref 136–145)
SP GR UR STRIP: 1.01 (ref 1–1.03)
URINE BILL WITH OR WITHOUT MIC: (no result)
URINE EPITHELIAL CELL AUTO: (no result) /LPF (ref 0–5)
UROBILINOGEN UR-MCNC: (no result) MG/DL
WBC # BLD AUTO: 32.09 K/UL (ref 4.8–10.8)

## 2017-11-19 NOTE — DIAGNOSTIC IMAGING REPORT
ABDOMEN AND PELVIS CT WITH IV AND ORAL CONTRAST



CT DOSE: 236.20 mGy.cm



HISTORY:      lower abd pain eval for divertic



TECHNIQUE: Multiaxial CT images of the abdomen and pelvis were performed

following the use of intravenous and oral contrast.  A dose lowering technique

was utilized adhering to the principles of ALARA.



COMPARISON STUDY: Abdomen and pelvis CT 10/10/2017.



FINDINGS: Mild interstitial thickening at the lung bases which is likely

chronic. Moderate hiatus hernia, unchanged. The liver, spleen, adrenal glands,

pancreas, and kidneys are unremarkable. No gallbladder wall thickening.

Calcified mediastinal lymph nodes. The bladder is unremarkable. Small fat and

fluid containing right inguinal hernia. A pessary device is noted. No bowel

obstruction. Colonic diverticulosis. No retroperitoneal lymphadenopathy.

Adjacent to the cecal base there is a small focal hypodensity best seen on image

263 measuring 3 cm. This was not present on the prior study one month earlier.

This is adjacent to the normal caliber appendix. This is of uncertain clinical

significance and could represent a small focus of fluid or fluid-filled bowel.



IMPRESSION: 



1. No definite bowel wall thickening or obstruction.

2. Small fat and fluid containing right femoral hernia.

3. A 3 cm focal low density structure adjacent to the cecal base and normal

appendix. This was not present on the prior study and is of uncertain clinical

significance. This could represent a small focus of fluid or fluid-filled bowel.

4. Colonic diverticulosis.

5. Moderate hiatus hernia, unchanged. 







Electronically signed by:  Jean-Claude Archer M.D.

11/19/2017 10:54 PM



Dictated Date/Time:  11/19/2017 10:29 PM

## 2017-11-20 VITALS
OXYGEN SATURATION: 92 % | TEMPERATURE: 98.06 F | HEART RATE: 75 BPM | DIASTOLIC BLOOD PRESSURE: 74 MMHG | SYSTOLIC BLOOD PRESSURE: 145 MMHG

## 2017-11-20 VITALS — OXYGEN SATURATION: 92 %

## 2017-11-20 VITALS
SYSTOLIC BLOOD PRESSURE: 169 MMHG | OXYGEN SATURATION: 98 % | TEMPERATURE: 98.6 F | DIASTOLIC BLOOD PRESSURE: 90 MMHG | HEART RATE: 86 BPM

## 2017-11-20 VITALS
SYSTOLIC BLOOD PRESSURE: 141 MMHG | TEMPERATURE: 98.6 F | DIASTOLIC BLOOD PRESSURE: 80 MMHG | OXYGEN SATURATION: 97 % | HEART RATE: 80 BPM

## 2017-11-20 VITALS
OXYGEN SATURATION: 97 % | DIASTOLIC BLOOD PRESSURE: 72 MMHG | TEMPERATURE: 99.68 F | SYSTOLIC BLOOD PRESSURE: 122 MMHG | HEART RATE: 82 BPM

## 2017-11-20 VITALS
TEMPERATURE: 98.06 F | OXYGEN SATURATION: 97 % | HEART RATE: 78 BPM | SYSTOLIC BLOOD PRESSURE: 108 MMHG | DIASTOLIC BLOOD PRESSURE: 52 MMHG

## 2017-11-20 VITALS — OXYGEN SATURATION: 97 %

## 2017-11-20 VITALS
TEMPERATURE: 98.96 F | HEART RATE: 85 BPM | SYSTOLIC BLOOD PRESSURE: 152 MMHG | DIASTOLIC BLOOD PRESSURE: 84 MMHG | OXYGEN SATURATION: 100 %

## 2017-11-20 LAB
ANION GAP SERPL CALC-SCNC: 8 MMOL/L (ref 3–11)
BUN SERPL-MCNC: 10 MG/DL (ref 7–18)
BUN/CREAT SERPL: 9.4 (ref 10–20)
CALCIUM SERPL-MCNC: 8.3 MG/DL (ref 8.5–10.1)
CHLORIDE SERPL-SCNC: 103 MMOL/L (ref 98–107)
CKMB/CK RATIO: 2.3 (ref 0–3)
CKMB/CK RATIO: 2.7 (ref 0–3)
CO2 SERPL-SCNC: 27 MMOL/L (ref 21–32)
CREAT CL PREDICTED SERPL C-G-VRATE: 28.8 ML/MIN
CREAT SERPL-MCNC: 1.08 MG/DL (ref 0.6–1.2)
EOSINOPHIL NFR BLD AUTO: 143 K/UL (ref 130–400)
GLUCOSE SERPL-MCNC: 97 MG/DL (ref 70–99)
HCT VFR BLD CALC: 33.9 % (ref 37–47)
MAGNESIUM SERPL-MCNC: 2.1 MG/DL (ref 1.8–2.4)
MCH RBC QN AUTO: 29.1 PG (ref 25–34)
MCHC RBC AUTO-ENTMCNC: 31 G/DL (ref 32–36)
MCV RBC AUTO: 93.9 FL (ref 80–100)
PMV BLD AUTO: 9.3 FL (ref 7.4–10.4)
POTASSIUM SERPL-SCNC: 4.3 MMOL/L (ref 3.5–5.1)
PREG INTERNAL NEGATIVE QC: (no result)
PREG INTERNAL POSITIVE QC: (no result)
RBC # BLD AUTO: 3.61 M/UL (ref 4.2–5.4)
SODIUM SERPL-SCNC: 139 MMOL/L (ref 136–145)
WBC # BLD AUTO: 25.47 K/UL (ref 4.8–10.8)

## 2017-11-20 RX ADMIN — ALUMINUM ZIRCONIUM TRICHLOROHYDREX GLY SCH EA: 0.2 STICK TOPICAL at 15:06

## 2017-11-20 RX ADMIN — HYDROCODONE BITARTRATE AND ACETAMINOPHEN PRN TAB: 5; 325 TABLET ORAL at 02:20

## 2017-11-20 RX ADMIN — VENLAFAXINE HYDROCHLORIDE SCH MG: 37.5 CAPSULE, EXTENDED RELEASE ORAL at 07:48

## 2017-11-20 RX ADMIN — CEFTRIAXONE SODIUM SCH MLS/HR: 1 INJECTION, POWDER, FOR SOLUTION INTRAVENOUS at 01:26

## 2017-11-20 RX ADMIN — FLUTICASONE PROPIONATE SCH SPRAYS: 50 SPRAY, METERED NASAL at 07:47

## 2017-11-20 RX ADMIN — VENLAFAXINE HYDROCHLORIDE SCH MG: 75 CAPSULE, EXTENDED RELEASE ORAL at 07:48

## 2017-11-20 RX ADMIN — FERROUS SULFATE TAB EC 325 MG (65 MG FE EQUIVALENT) SCH MG: 325 (65 FE) TABLET DELAYED RESPONSE at 07:48

## 2017-11-20 RX ADMIN — VENLAFAXINE HYDROCHLORIDE SCH MG: 150 CAPSULE, EXTENDED RELEASE ORAL at 07:47

## 2017-11-20 RX ADMIN — ACETAMINOPHEN SCH MG: 500 TABLET, COATED ORAL at 21:39

## 2017-11-20 RX ADMIN — SODIUM CHLORIDE SCH MLS/HR: 900 INJECTION, SOLUTION INTRAVENOUS at 01:26

## 2017-11-20 RX ADMIN — HYDROCODONE BITARTRATE AND ACETAMINOPHEN PRN TAB: 5; 325 TABLET ORAL at 11:27

## 2017-11-20 RX ADMIN — TRAMADOL HYDROCHLORIDE SCH MG: 50 TABLET, COATED ORAL at 21:38

## 2017-11-20 RX ADMIN — SODIUM CHLORIDE SCH MLS/HR: 900 INJECTION, SOLUTION INTRAVENOUS at 14:00

## 2017-11-20 RX ADMIN — ALUMINUM ZIRCONIUM TRICHLOROHYDREX GLY SCH EA: 0.2 STICK TOPICAL at 07:47

## 2017-11-20 RX ADMIN — ALUMINUM ZIRCONIUM TRICHLOROHYDREX GLY SCH EA: 0.2 STICK TOPICAL at 23:43

## 2017-11-20 RX ADMIN — HYDROCODONE BITARTRATE AND ACETAMINOPHEN PRN TAB: 5; 325 TABLET ORAL at 17:24

## 2017-11-20 RX ADMIN — LEVOTHYROXINE SODIUM SCH MCG: 50 TABLET ORAL at 05:53

## 2017-11-20 RX ADMIN — FERROUS SULFATE TAB EC 325 MG (65 MG FE EQUIVALENT) SCH MG: 325 (65 FE) TABLET DELAYED RESPONSE at 20:46

## 2017-11-20 NOTE — DIAGNOSTIC IMAGING REPORT
LUMBAR SPINE 2 OR 3 VIEWS



CLINICAL HISTORY: worsening back pain with radiation on lower ribs, h/o CLL    



COMPARISON STUDY:  No previous studies for comparison.



FINDINGS: There is contrast within nondilated colon. There is a thoracolumbar

scoliosis. No acute fractures or rheumatic subluxations are visualized. There is

a minimal grade 1 spondylolisthesis of L4 and L5. There are moderate multilevel

degenerative changes with multilevel disc space narrowing most pronounced the

L4-5 and L5-S1 levels. No destructive lesions are visualized on conventional

radiographic imaging. There is elevation/eventration of the right hemidiaphragm.



IMPRESSION:  Moderate multilevel degenerative change. No acute fractures.

Scoliosis. 









Electronically signed by:  Joshua Medina M.D.

11/20/2017 1:07 PM



Dictated Date/Time:  11/20/2017 1:06 PM

## 2017-11-20 NOTE — HISTORY AND PHYSICAL
History & Physical


Date & Time of Service:


Nov 20, 2017 at 00:04


Chief Complaint:


Ab Pain


Primary Care Physician:


Germán Do D.O.


History of Present Illness


Source:  patient, clinic records


This is an 86 year old female with a PMH of CLL, CAD, HTN, CKD stage 3, asthma 

- presents to the hospital due to suprapubic pain that radiated to her 

bilateral ribs. She states that she has chronic back pain and takes tylenol #3 

for it - she states that the back pain was worsening the past few days and she 

had to use a massager for it. She then developed this abdominal pain that began 

in her suprapubic region. Denied urinary symptoms. The pain then shifted over 

to her lateral ribs on both sides. She did not have difficulty breathing; but 

states the pain seemed similar to when she fractured her ribs in the past. When 

she got to the ER, the pain was at her epigastric region. Denies n/v/d/

constipation, denies dysuria/frequency, denies fevers/chills, denies chest pain/

shortness of breath.





Received IV morphine in the ED with some relief of the pain.





Family History





No pertinent family history





Social History


Smoking Status:  Never Smoker


Drug Use:  none


Marital Status:  


Occupational Status:  retired





Immunizations


History of Influenza Vaccine:  Yes


Influenza Vaccine Date:  Oct 2, 2015


History of Tetanus Vaccine?:  Unknown


History of Pneumococcal:  Unknown


History of Hepatitis B Vaccine:  Unknown





Multi-Drug Resistant Organisms


History of MDRO:  No





Allergies


Coded Allergies:  


     Adhesives (Verified  Allergy, Intermediate, if on for a while-red blisters

, 11/19/17)


     Bacitracin (Verified  Allergy, Mild, ALLERGY-IRRITATION, 11/19/17)


     Neomycin (Verified  Allergy, Mild, ALLERGY, 11/19/17)


     Polymyxin B (Verified  Allergy, Mild, ALLERGY, 11/19/17)





Home Medications


Scheduled


Aspirin (Aspirin), 81 MG PO QPM


Atorvastatin (Lipitor), 40 MG PO QPM


Azelastine Hcl (Astelin Nasal [place]), 1 SPRAY JULIO BID


Bimatoprost (Lumigan), 1 DROP OPB HS


Calcium Carbonate-Vitamin D (Calcium 600 + D), 1 TABS PO QPM


Cholecalciferol (Vitamin D3), 2 TABS PO DAILY


Ferrous Sulfate (Ferrous Sulfate), 325 MG PO BID


Fluticasone Propionate (Nasal) (Flonase Allergy Relief), 2 SPRAYS JULIO DAILY 

AFTERNOON


Gabapentin (Neurontin), 300 MG PO HS


Lactobacillus-Inulin (Culturelle), 1 CAP PO QPM


Lamotrigine (Lamictal), 100 MG PO BID


Levothyroxine Sodium (Synthroid), 50 MCG PO DAILY


Lisinopril (Zestril), 10 MG PO QPM


Lorazepam (Ativan), 0.25 MG PO HS


Metoprolol Succinate (Toprol Xl), 25 MG PO QPM


Mometasone Furoate-Formoterol (Dulera 100/5 Mcg), 2 PUFFS INH Q12


Quetiapine Fumarate (Seroquel), 25 MG PO HS


Venlafaxine Hcl (Effexor Xr), 262.5 MG PO QAM





Scheduled PRN


Acetaminophen/Codeine (Tylenol W/Codeine #3), 1 TAB PO BID PRN for Pain


Epoetin Enmanuel (Procrit), 1 DOSE SC WK PRN for HEMOGLOBIN LEVEL < 12


Levalbuterol Tartrate (Levalbuterol Tartrate Hfa), 2 PUFFS INH Q4H PRN for PRN


Nitroglycerin (Nitrostat), 0.4 MG UT UD PRN for Chest Pain





Review of Systems


Constitutional:  No fever, No chills, No sweats, No weight loss, No weakness, 

No fatigue


Eyes:  No worsening of vision


Respiratory:  No cough, No sputum, No wheezing, No shortness of breath, No 

dyspnea on exertion


Cardiovascular:  No chest pain, No edema, No palpitations


Abdomen:  + pain, No nausea, No vomiting, No diarrhea, No constipation, No GI 

bleeding


Musculoskeletal:  + joint pain (chronic back pain, rib pain)


Genitourinary - Female:  No dysuria, No urinary frequency, No urinary urgency, 

No urinary incontinence, No urinary retention, No hematuria


Neurologic:  No memory loss


Psychiatric:  No depression symptoms


Hematologic / Lymphatic:  No abnormal bleeding/bruising


Integumentary:  No rash


Allergic / Immunologic:  No environmental allergies, No seasonal allergies





Physical Exam


Vital Signs











  Date Time  Temp Pulse Resp B/P (MAP) Pulse Ox O2 Delivery O2 Flow Rate FiO2


 


11/19/17 23:58  80 16 151/70 97 Room Air  


 


11/19/17 23:06  83      


 


11/19/17 22:30  82 18 154/97 99 Room Air  


 


11/19/17 21:12  84 18 158/66 100 Room Air  


 


11/19/17 19:28  75      


 


11/19/17 19:20 37.2 85 18 181/79 95 Room Air  








General Appearance:  no apparent distress


Head:  normocephalic, atraumatic


Eyes:  normal inspection


ENT:  hearing grossly normal


Neck:  supple


Respiratory/Chest:  lungs clear, normal breath sounds, no respiratory distress, 

no accessory muscle use, + pertinent finding (+lateral rib tenderness)


Cardiovascular:  regular rate, rhythm, no edema, no murmur


Abdomen/GI:  normal bowel sounds, soft, + tenderness (epigastric tenderness to 

palpation)


Extremities/Musculoskelatal:  normal inspection, no calf tenderness, normal 

capillary refill, no pedal edema, normal range of motion


Neurologic/Psych:  CNs II-XII nml as tested, no motor/sensory deficits, alert, 

normal mood/affect, oriented x 3


Skin:  normal color


Lymphatic:  no adenopathy





Diagnostics


Laboratory Results





Results Past 24 Hours








Test


  11/19/17


19:44 11/19/17


20:50 Range/Units


 


 


White Blood Count 32.09  4.8-10.8  K/uL


 


Red Blood Count 4.13  4.2-5.4  M/uL


 


Hemoglobin 12.1  12.0-16.0  g/dL


 


Hematocrit 38.2  37-47  %


 


Mean Corpuscular Volume 92.5    fL


 


Mean Corpuscular Hemoglobin 29.3  25-34  pg


 


Mean Corpuscular Hemoglobin


Concent 31.7


  


  32-36  g/dl


 


 


Platelet Count 189  130-400  K/uL


 


Mean Platelet Volume 9.9  7.4-10.4  fL


 


Neutrophils (%) (Auto) 12.9   %


 


Lymphocytes (%) (Auto) 80.1   %


 


Monocytes (%) (Auto) 4.3   %


 


Eosinophils (%) (Auto) 2.2   %


 


Basophils (%) (Auto) 0.3   %


 


Neutrophils # (Auto) 4.16  1.4-6.5  K/uL


 


Lymphocytes # (Auto) 25.70  1.2-3.4  K/uL


 


Monocytes # (Auto) 1.38  0.11-0.59  K/uL


 


Eosinophils # (Auto) 0.69  0-0.5  K/uL


 


Basophils # (Auto) 0.09  0-0.2  K/uL


 


RDW Standard Deviation 45.6  36.4-46.3  fL


 


RDW Coefficient of Variation 13.5  11.5-14.5  %


 


Immature Granulocyte % (Auto) 0.2   %


 


Immature Granulocyte # (Auto) 0.07  0.00-0.02  K/uL


 


Smudge Cells PRESENT   


 


Prothrombin Time


  11.3


  


  9.0-12.0


SECONDS


 


Prothromb Time International


Ratio 1.1


  


  0.9-1.1  


 


 


Activated Partial


Thromboplast Time 23.3


  


  21.0-31.0


SECONDS


 


Partial Thromboplastin Ratio 0.9   


 


Sodium Level 138  136-145  mmol/L


 


Potassium Level 4.0  3.5-5.1  mmol/L


 


Chloride Level 103    mmol/L


 


Carbon Dioxide Level 24  21-32  mmol/L


 


Anion Gap 10.0  3-11  mmol/L


 


Blood Urea Nitrogen 14  7-18  mg/dl


 


Creatinine


  1.04


  


  0.60-1.20


mg/dl


 


Est Creatinine Clear Calc


Drug Dose 30.0


  


   ml/min


 


 


Estimated GFR (


American) 56.3


  


   


 


 


Estimated GFR (Non-


American 48.6


  


   


 


 


BUN/Creatinine Ratio 13.2  10-20  


 


Random Glucose 91  70-99  mg/dl


 


Calcium Level 9.1  8.5-10.1  mg/dl


 


Total Bilirubin 0.3  0.2-1  mg/dl


 


Direct Bilirubin < 0.1  0-0.2  mg/dl


 


Aspartate Amino Transf


(AST/SGOT) 20


  


  15-37  U/L


 


 


Alanine Aminotransferase


(ALT/SGPT) 24


  


  12-78  U/L


 


 


Alkaline Phosphatase 83    U/L


 


Troponin I < 0.015  0-0.045  ng/ml


 


Total Protein 6.9  6.4-8.2  gm/dl


 


Albumin 3.8  3.4-5.0  gm/dl


 


Lipase 469    U/L


 


Urine Color  YELLOW  


 


Urine Appearance  CLEAR CLEAR  


 


Urine pH  8.0 4.5-7.5  


 


Urine Specific Gravity  1.006 1.000-1.030  


 


Urine Protein  NEG NEG  


 


Urine Glucose (UA)  NEG NEG  


 


Urine Ketones  NEG NEG  


 


Urine Occult Blood  TRACE NEG  


 


Urine Nitrite  NEG NEG  


 


Urine Bilirubin  NEG NEG  


 


Urine Urobilinogen  NEG NEG  


 


Urine Leukocyte Esterase  LARGE NEG  


 


Urine WBC (Auto)  >30 0-5  /hpf


 


Urine RBC (Auto)  0-4 0-4  /hpf


 


Urine Hyaline Casts (Auto)  0 0-5  /lpf


 


Urine Epithelial Cells (Auto)  5-10 0-5  /lpf


 


Urine Bacteria (Auto)  NEG NEG  








Microbiology Results


11/19/17 Urine Culture, Received


           Pending





Diagnostic Radiology


ABDOMEN AND PELVIS CT WITH IV AND ORAL CONTRAST





CT DOSE: 236.20 mGy.cm





HISTORY:      lower abd pain eval for divertic





TECHNIQUE: Multiaxial CT images of the abdomen and pelvis were performed


following the use of intravenous and oral contrast.  A dose lowering technique


was utilized adhering to the principles of ALARA.





COMPARISON STUDY: Abdomen and pelvis CT 10/10/2017.





FINDINGS: Mild interstitial thickening at the lung bases which is likely


chronic. Moderate hiatus hernia, unchanged. The liver, spleen, adrenal glands,


pancreas, and kidneys are unremarkable. No gallbladder wall thickening.


Calcified mediastinal lymph nodes. The bladder is unremarkable. Small fat and


fluid containing right inguinal hernia. A pessary device is noted. No bowel


obstruction. Colonic diverticulosis. No retroperitoneal lymphadenopathy.


Adjacent to the cecal base there is a small focal hypodensity best seen on image


263 measuring 3 cm. This was not present on the prior study one month earlier.


This is adjacent to the normal caliber appendix. This is of uncertain clinical


significance and could represent a small focus of fluid or fluid-filled bowel.





IMPRESSION: 





1. No definite bowel wall thickening or obstruction.


2. Small fat and fluid containing right femoral hernia.


3. A 3 cm focal low density structure adjacent to the cecal base and normal


appendix. This was not present on the prior study and is of uncertain clinical


significance. This could represent a small focus of fluid or fluid-filled bowel.


4. Colonic diverticulosis.


5. Moderate hiatus hernia, unchanged.





Impression


Assessment and Plan


This is an 86 year old female with a PMH of CLL, CAD, HTN, CKD stage 3, asthma 

- presents to the hospital due to suprapubic pain that radiated to her 

bilateral ribs.





Suprapubic Pain


Epigastric Pain


pain is diffuse, crampy type of pain


UA with +leukocytes, no bacteria


will start Rocephin x1, urine culture pending


will alternate Norco and Morphine PRN for pain


check a CXR


recheck lipase in AM


goal is to get her back to her Tylenol #3, which she takes for chronic back pain


monitor in tele, trend cardiac enzymes





CLL


leukocytosis, 32k; improved from baseline


Hgb 12 - receives Procrit as outpatient





CAD


no chest pain


does have epigastric tenderness


will monitor in tele, trend cardiac enzymes


continue aspirin, statin, b-blocker, ACE-I





HTN


continue b-blocker, ACE-I, monitor BP as it is slightly elevated this evening





CKD stage 3


monitor creat, it is at baseline, avoid nephrotoxic agents if able





DVT ppx


Lovenox





DNR





VTE Prophylaxis


VTE Risk Assessment Done? Y/N:  Yes


Risk Level:  Moderate

## 2017-11-20 NOTE — PROGRESS NOTE
Medicine Progress Note


Date & Time of Visit:


Nov 20, 2017 at 11:01.


Subjective


87 yo F with a h/o CLL who presents with acute worsening of her mid-back pain 

with radiation around the ribs posterior to anterior.  She is also s/p 

herniorrhaphy one month ago and had some suprapubic discomfort when the rib 

pain was bothering her.  Today she states that it is more controlled with 

narcotic pills but two nights ago she wasn't able to lie down comfortably 

because the pain was so intense.  She also reports some intermittent nausea 

over the last two weeks.  She has chronic incontinence but no new urinary 

symptoms.  Based on her UA results, she was empirically placed on Rocephin 

pending urine culture results.  She is able to ambulate and eat without issue.  

She admits to osteoporosis, however, DEXA scans were reviewed with no evidence 

of OP.  





-tolerating PO


-pain controlled with PO narcotics


-ambulatory





Objective





Last 8 Hrs








  Date Time  Temp Pulse Resp B/P (MAP) Pulse Ox O2 Delivery O2 Flow Rate FiO2


 


11/20/17 08:00     97 Room Air  


 


11/20/17 07:34 36.7 78 16 108/52 (70) 97 Room Air  


 


11/20/17 05:12 37.0 80 18 141/80 (100) 97 Room Air  


 


11/20/17 04:00      Room Air  








Physical Exam:


GEN: WNWD, in no acute distress, alert and appropriate, able to stand up at 

bedside with ease


HEENT: NC/AT, normal sclerae, MMM


CARDIO: reg rate, S1/2 heard without m/g/r


LUNGS: CTA bilaterally, no crackles, rales or wheezes, good diaphragmatic 

excursion


CHEST WALL:  no TTP


POSTERIOR/LATERAL/ANTERIOR RIBS: no TTP along intercostal spaces except for 

some mild TTP on the R lower anterior ribs


ABD: soft, non-tender, non-distended, no rebound or guarding, +BS.  Upon 

standing RLQ hernia is present.  Well healed incision in this area.  No 

surrounding erythema. 


EXTREMITY: RP and DP palpable 2+ bilat, no LE swelling or edema, extremities 

are warm and well-perfused


NEURO: CN 2-12 grossly intact, no gross focal deficits. 


MUSC: 5/5 strength throughout, no focal deficits


SKIN:  warm and dry


Laboratory Results:


11/20/17 05:51








11/20/17 05:51

















Test


  11/19/17


19:44 11/19/17


20:50 11/20/17


05:51 11/20/17


06:05


 


Immature Granulocyte % (Auto) 0.2 %    


 


White Blood Count


  32.09 K/uL


(4.8-10.8) 


  


  


 


 


Red Blood Count


  4.13 M/uL


(4.2-5.4) 


  3.61 M/uL


(4.2-5.4) 


 


 


Hemoglobin


  12.1 g/dL


(12.0-16.0) 


  


  


 


 


Hematocrit 38.2 % (37-47)    


 


Mean Corpuscular Volume


  92.5 fL


() 


  93.9 fL


() 


 


 


Mean Corpuscular Hemoglobin


  29.3 pg


(25-34) 


  29.1 pg


(25-34) 


 


 


Mean Corpuscular Hemoglobin


Concent 31.7 g/dl


(32-36) 


  31.0 g/dl


(32-36) 


 


 


Platelet Count


  189 K/uL


(130-400) 


  


  


 


 


Mean Platelet Volume


  9.9 fL


(7.4-10.4) 


  9.3 fL


(7.4-10.4) 


 


 


Neutrophils (%) (Auto) 12.9 %    


 


Lymphocytes (%) (Auto) 80.1 %    


 


Monocytes (%) (Auto) 4.3 %    


 


Eosinophils (%) (Auto) 2.2 %    


 


Basophils (%) (Auto) 0.3 %    


 


Neutrophils # (Auto)


  4.16 K/uL


(1.4-6.5) 


  


  


 


 


Lymphocytes # (Auto)


  25.70 K/uL


(1.2-3.4) 


  


  


 


 


Monocytes # (Auto)


  1.38 K/uL


(0.11-0.59) 


  


  


 


 


Eosinophils # (Auto)


  0.69 K/uL


(0-0.5) 


  


  


 


 


Basophils # (Auto)


  0.09 K/uL


(0-0.2) 


  


  


 


 


Immature Granulocyte # (Auto)


  0.07 K/uL


(0.00-0.02) 


  


  


 


 


Smudge Cells PRESENT    


 


Prothrombin Time


  11.3 SECONDS


(9.0-12.0) 


  


  


 


 


Prothromb Time International


Ratio 1.1 (0.9-1.1) 


  


  


  


 


 


Activated Partial


Thromboplast Time 23.3 SECONDS


(21.0-31.0) 


  


  


 


 


Partial Thromboplastin Ratio 0.9    


 


Total Bilirubin


  0.3 mg/dl


(0.2-1) 


  


  


 


 


Direct Bilirubin


  < 0.1 mg/dl


(0-0.2) 


  


  


 


 


Aspartate Amino Transf


(AST/SGOT) 20 U/L (15-37) 


  


  


  


 


 


Alanine Aminotransferase


(ALT/SGPT) 24 U/L (12-78) 


  


  


  


 


 


Alkaline Phosphatase


  83 U/L


() 


  


  


 


 


Total Protein


  6.9 gm/dl


(6.4-8.2) 


  


  


 


 


Albumin


  3.8 gm/dl


(3.4-5.0) 


  


  


 


 


Urine Color  YELLOW   


 


Urine Appearance  CLEAR (CLEAR)   


 


Urine pH  8.0 (4.5-7.5)   


 


Urine Specific Gravity


  


  1.006


(1.000-1.030) 


  


 


 


Urine Protein  NEG (NEG)   


 


Urine Glucose (UA)  NEG (NEG)   


 


Urine Ketones  NEG (NEG)   


 


Urine Occult Blood  TRACE (NEG)   


 


Urine Nitrite  NEG (NEG)   


 


Urine Bilirubin  NEG (NEG)   


 


Urine Urobilinogen  NEG (NEG)   


 


Urine Leukocyte Esterase  LARGE (NEG)   


 


Urine WBC (Auto)  >30 /hpf (0-5)   


 


Urine RBC (Auto)  0-4 /hpf (0-4)   


 


Urine Hyaline Casts (Auto)  0 /lpf (0-5)   


 


Urine Epithelial Cells (Auto)


  


  5-10 /lpf


(0-5) 


  


 


 


Urine Bacteria (Auto)  NEG (NEG)   


 


RDW Standard Deviation


  


  


  47.6 fL


(36.4-46.3) 


 


 


RDW Coefficient of Variation


  


  


  13.8 %


(11.5-14.5) 


 


 


Anion Gap


  


  


  8.0 mmol/L


(3-11) 


 


 


Est Creatinine Clear Calc


Drug Dose 


  


  28.8 ml/min 


  


 


 


Estimated GFR (


American) 


  


  53.8 


  


 


 


Estimated GFR (Non-


American 


  


  46.4 


  


 


 


BUN/Creatinine Ratio   9.4 (10-20)  


 


Calcium Level


  


  


  8.3 mg/dl


(8.5-10.1) 


 


 


Magnesium Level


  


  


  2.1 mg/dl


(1.8-2.4) 


 


 


Lipase


  


  


  230 U/L


() 


 


 


Urine Pregnancy Test    NEG (NEG) 


 


Test


  11/20/17


13:39 


  


  


 


 


Total Creatine Kinase


  60 U/L


() 


  


  


 


 


Creatine Kinase MB


  1.6 ng/ml


(0.5-3.6) 


  


  


 


 


Creatine Kinase MB Ratio 2.7 (0-3.0)    


 


Troponin I


  < 0.015 ng/ml


(0-0.045) 


  


  


 














 Date/Time


Source Procedure


Growth Status


 


 


 11/19/17 20:50


Urine , Clean Catch Urine Culture


Pending Received








Last 24 Hours








Test


  11/19/17


19:44 11/19/17


20:50 11/20/17


05:51 11/20/17


06:05


 


White Blood Count 32.09 K/uL   25.47 K/uL  


 


Red Blood Count 4.13 M/uL   3.61 M/uL  


 


Hemoglobin 12.1 g/dL   10.5 g/dL  


 


Hematocrit 38.2 %   33.9 %  


 


Mean Corpuscular Volume 92.5 fL   93.9 fL  


 


Mean Corpuscular Hemoglobin 29.3 pg   29.1 pg  


 


Mean Corpuscular Hemoglobin


Concent 31.7 g/dl 


  


  31.0 g/dl 


  


 


 


Platelet Count 189 K/uL   143 K/uL  


 


Mean Platelet Volume 9.9 fL   9.3 fL  


 


Neutrophils (%) (Auto) 12.9 %    


 


Lymphocytes (%) (Auto) 80.1 %    


 


Monocytes (%) (Auto) 4.3 %    


 


Eosinophils (%) (Auto) 2.2 %    


 


Basophils (%) (Auto) 0.3 %    


 


Neutrophils # (Auto) 4.16 K/uL    


 


Lymphocytes # (Auto) 25.70 K/uL    


 


Monocytes # (Auto) 1.38 K/uL    


 


Eosinophils # (Auto) 0.69 K/uL    


 


Basophils # (Auto) 0.09 K/uL    


 


RDW Standard Deviation 45.6 fL   47.6 fL  


 


RDW Coefficient of Variation 13.5 %   13.8 %  


 


Immature Granulocyte % (Auto) 0.2 %    


 


Immature Granulocyte # (Auto) 0.07 K/uL    


 


Smudge Cells PRESENT    


 


Prothrombin Time 11.3 SECONDS    


 


Prothromb Time International


Ratio 1.1 


  


  


  


 


 


Activated Partial


Thromboplast Time 23.3 SECONDS 


  


  


  


 


 


Partial Thromboplastin Ratio 0.9    


 


Sodium Level 138 mmol/L   139 mmol/L  


 


Potassium Level 4.0 mmol/L   4.3 mmol/L  


 


Chloride Level 103 mmol/L   103 mmol/L  


 


Carbon Dioxide Level 24 mmol/L   27 mmol/L  


 


Anion Gap 10.0 mmol/L   8.0 mmol/L  


 


Blood Urea Nitrogen 14 mg/dl   10 mg/dl  


 


Creatinine 1.04 mg/dl   1.08 mg/dl  


 


Est Creatinine Clear Calc


Drug Dose 30.0 ml/min 


  


  28.8 ml/min 


  


 


 


Estimated GFR (


American) 56.3 


  


  53.8 


  


 


 


Estimated GFR (Non-


American 48.6 


  


  46.4 


  


 


 


BUN/Creatinine Ratio 13.2   9.4  


 


Random Glucose 91 mg/dl   97 mg/dl  


 


Calcium Level 9.1 mg/dl   8.3 mg/dl  


 


Total Bilirubin 0.3 mg/dl    


 


Direct Bilirubin < 0.1 mg/dl    


 


Aspartate Amino Transf


(AST/SGOT) 20 U/L 


  


  


  


 


 


Alanine Aminotransferase


(ALT/SGPT) 24 U/L 


  


  


  


 


 


Alkaline Phosphatase 83 U/L    


 


Troponin I < 0.015 ng/ml   < 0.015 ng/ml  


 


Total Protein 6.9 gm/dl    


 


Albumin 3.8 gm/dl    


 


Lipase 469 U/L   230 U/L  


 


Urine Color  YELLOW   


 


Urine Appearance  CLEAR   


 


Urine pH  8.0   


 


Urine Specific Gravity  1.006   


 


Urine Protein  NEG   


 


Urine Glucose (UA)  NEG   


 


Urine Ketones  NEG   


 


Urine Occult Blood  TRACE   


 


Urine Nitrite  NEG   


 


Urine Bilirubin  NEG   


 


Urine Urobilinogen  NEG   


 


Urine Leukocyte Esterase  LARGE   


 


Urine WBC (Auto)  >30 /hpf   


 


Urine RBC (Auto)  0-4 /hpf   


 


Urine Hyaline Casts (Auto)  0 /lpf   


 


Urine Epithelial Cells (Auto)  5-10 /lpf   


 


Urine Bacteria (Auto)  NEG   


 


Magnesium Level   2.1 mg/dl  


 


Total Creatine Kinase   56 U/L  


 


Creatine Kinase MB   1.3 ng/ml  


 


Creatine Kinase MB Ratio   2.3  


 


Urine Pregnancy Test    NEG 














 Date/Time


Source Procedure


Growth Status


 


 


 11/19/17 20:50


Urine , Clean Catch Urine Culture


Pending Received








Diagnostic Imaging:


LUMBAR SPINE 2 OR 3 VIEWS





CLINICAL HISTORY: worsening back pain with radiation on lower ribs, h/o CLL    





COMPARISON STUDY:  No previous studies for comparison.





FINDINGS: There is contrast within nondilated colon. There is a thoracolumbar


scoliosis. No acute fractures or rheumatic subluxations are visualized. There is


a minimal grade 1 spondylolisthesis of L4 and L5. There are moderate multilevel


degenerative changes with multilevel disc space narrowing most pronounced the


L4-5 and L5-S1 levels. No destructive lesions are visualized on conventional


radiographic imaging. There is elevation/eventration of the right hemidiaphragm.





IMPRESSION:  Moderate multilevel degenerative change. No acute fractures.


Scoliosis. 


------------------------------------------------


THORACIC SPINE 2 VIEWS





CLINICAL HISTORY: Chronic thoracic back pain.





FINDINGS: AP and lateral views of the thoracic spine are correlated with chest


radiographs dated 11/08/2015. The skeletal structures are osteopenic. There is


no radiographic evidence of fracture or malalignment. Vertebral body height and


alignment are maintained throughout the thoracic spine. There is moderate to


severe levocurvature of the thoracolumbar junction. Hyperkyphosis is observed.


Anterior and lateral marginal osteophytes are seen throughout. The transverse


processes and pedicles are grossly intact as seen on the frontal view. Moderate


multilevel degenerative disc space narrowing is noted. The visualized lung


parenchyma appears clear noting chronic interstitial thickening. There is


atherosclerotic calcification of the thoracic aorta.





IMPRESSION:





1. No acute bony abnormality is seen involving the thoracic spine.





2. Osteopenia with spondylotic change and kyphoscoliosis as above.





Assessment & Plan


87 yo F with a h/o CLL who presents with acute worsening of her mid-back pain 

with radiation around the ribs posterior to anterior.  She is also s/p 

herniorrhapy one month ago and had some suprapubic discomfort when the rib pain 

was bothering her.  Today she states that it is more controlled with narcotic 

pills but two nights ago she wasn't able to lie down comfortably because the 

pain was so intense.  She also reports some intermittent nausea over the last 

two weeks.  She has chronic incontinence but no new urinary symptoms.  Based on 

her UA results, she was empirically placed on Rocephin pending urine culture 

results.  She is able to ambulate and eat without issue.  She admits to 

osteoporosis, however, DEXA scans were reviewed with no evidence of OP.  





1.  Worsening back and rib pain with radiation anteriorly.  Clinical picture is 

not consistent with ACS and serial cardiac enzymes and EKG did not reveal 

ischemia.  Her pain is controlled with PO narcotics.  I was concerned about her 

h/o cancer and her inability to lie flat with worsening back pain.  Thoracic 

and lumbar images were obtained and did not show any acute findings.  The 

etiology of her pain is still unclear but she is ambulatory and appears to be 

improving.  May want to consider a trial of outpatient physical therapy.  Cont 

pain meds as needed. 


2.  Suprapubic discomfort-although her UA had some appearance of a possible UTI

, she denies any acute UTI symptoms.  She has had a recent herniorrhaphy one 

month ago and appears to still have a hernia present-possible mesh in the wrong 

place?  I discussed the abnormality on CT scan with Dr. Archer from 

Radiology and he confirmed that this was consistent with post-op changes.  

There is no bowel in the hernia.  From the way it is described by the patient, 

it appears that her back pain causes the lower abdominal discomfort because of 

her recent surgery.  They appear to be separate issues that are connected.  

Cont Rocephin empirically until culture results return tomorrow. 


3.  CLL-her WBC is at baseline with no new constitutional symptoms reported. 

Cont per Oncology. 


4.  CAD-stable, no chest pain, symptoms not consistent with ACS.  Cont ASA, 

Statin, BBlocker and ACEI for medical management of CAD


5.  HTN-controlled, cont home meds


6.  CKD III-creatinine at baseline.  


7.  Anemia-chronic disease, no indication for transfusion at this time. 





DVT prophy:  Lovenox


DNR


Dispo-likely to home in am as long as pain controlled.  Consider outpatient PT 

for back pain





Gloria Palmer DO


[facility] Hospitalist


Current Inpatient Medications:





Current Inpatient Medications








 Medications


  (Trade)  Dose


 Ordered  Sig/Debi


 Route  Start Time


 Stop Time Status Last Admin


Dose Admin


 


 Ioversol


  (Optiray 320)  100 ml  UD  PRN


 IV  11/19/17 20:30


 11/23/17 20:29   


 


 


 Aspirin


  (Ecotrin Tab)  81 mg  QPM


 PO  11/20/17 21:00


 12/20/17 20:59   


 


 


 Atorvastatin


 Calcium


  (Lipitor Tab)  40 mg  QPM


 PO  11/20/17 21:00


 12/20/17 20:59   


 


 


 Ferrous Sulfate


  (Feosol Tab)  325 mg  BID


 PO  11/20/17 09:00


 12/20/17 08:59  11/20/17 07:48


325 MG


 


 Fluticasone


 Propionate


  (Flonase Nasal


 Spray)  2 sprays  DAILY


 JULIO  11/20/17 09:00


 12/20/17 08:59  11/20/17 07:47


2 SPRAYS


 


 Gabapentin


  (Neurontin Cap)  300 mg  HS


 PO  11/20/17 21:00


 12/20/17 20:59   


 


 


 Lamotrigine


  (Lamictal Tab)  100 mg  BID


 PO  11/20/17 09:00


 12/20/17 08:59  11/20/17 07:48


100 MG


 


 Levalbuterol


  (Xopenex Hfa


 Inhaler)  2 puffs  Q4H  PRN


 INH  11/19/17 23:45


 12/19/17 23:44   


 


 


 Levothyroxine


 Sodium


  (Synthroid Tab)  50 mcg  DAILYBB


 PO  11/20/17 06:30


 12/20/17 06:29  11/20/17 05:53


50 MCG


 


 Lisinopril


  (Zestril Tab)  10 mg  QPM


 PO  11/20/17 21:00


 12/20/17 20:59   


 


 


 Lorazepam


  (Ativan Tab)  0.25 mg  HS


 PO  11/20/17 21:00


 12/20/17 20:59   


 


 


 Metoprolol


 Succinate


  (Toprol Xl Tab)  25 mg  QPM


 PO  11/20/17 21:00


 12/20/17 20:59   


 


 


 Quetiapine


 Fumarate


  (seroQUEL TAB)  25 mg  HS


 PO  11/20/17 21:00


 12/20/17 20:59   


 


 


 Venlafaxine HCl


  (effeXOR


 EXTENDED REL CAP)  75 mg  QAM


 PO  11/20/17 09:00


 12/20/17 08:59  11/20/17 07:48


75 MG


 


 Miscellaneous


 Information


  (Order Awaiting


 Action)  1 ea  QS


 N/A  11/20/17 08:00


 12/20/17 07:59   


 


 


 Bimatoprost


  (Lumigan 0.01%)  1 drops  HS


 OPB  11/20/17 21:00


 12/20/17 20:59   


 


 


 Calcium/Vitamin D


  (Caltrate Plus


 Tab)  1 tab  QPM


 PO  11/20/17 21:00


 12/20/17 20:59   


 


 


 Lactobacillus


 Acidophilus


  (Floranex Tab)  1 tab  QPM


 PO  11/20/17 21:00


 12/20/17 20:59   


 


 


 Morphine Sulfate


  (MoRPHine


 SULFATE INJ)  2 mg  Q4  PRN


 IV  11/19/17 23:45


 12/3/17 23:44   


 


 


 Acetaminophen/


 Hydrocodone Bitart


  (Norco 5/325 Tab)  1 tab  Q4  PRN


 PO  11/19/17 23:45


 12/3/17 23:44  11/20/17 02:20


1 TAB


 


 Ceftriaxone


 Sodium 1 gm/


 Dextrose  50 ml @ 


 100 mls/hr  Q24H


 IV  11/20/17 01:00


 11/25/17 00:59  11/20/17 01:26


100 MLS/HR


 


 Enoxaparin Sodium


  (Lovenox Inj)  40 mg  Q24H


 SC  11/20/17 09:00


 12/20/17 08:59   


 


 


 Sodium Chloride  1,000 ml @ 


 80 mls/hr  [id number]


 IV  11/20/17 01:00


 12/20/17 00:59  11/20/17 01:26


80 MLS/HR


 


 Acetaminophen


  (Tylenol Tab)  650 mg  Q4H  PRN


 PO  11/19/17 23:45


 12/19/17 23:44   


 


 


 Al Hydrox/Mg


 Hydrox/Simethicone


  (Maalox Max Susp)  15 ml  Q4H  PRN


 PO  11/19/17 23:45


 12/19/17 23:44   


 


 


 Magnesium


 Hydroxide


  (Milk Of


 Magnesia Susp)  30 ml  Q12H  PRN


 PO  11/19/17 23:45


 12/19/17 23:44   


 


 


 Miscellaneous


  (Iv Fluids


 Completed)  1 ea  PRN  PRN


 N/A  11/20/17 00:15


 11/20/18 00:14   


 


 


 Venlafaxine HCl


  (effeXOR


 EXTENDED REL CAP)  150 mg  QAM


 PO  11/20/17 09:00


 12/20/17 08:59  11/20/17 07:47


150 MG


 


 Venlafaxine HCl


  (effeXOR


 EXTENDED REL CAP)  37.5 mg  QAM


 PO  11/20/17 09:00


 12/20/17 08:59  11/20/17 07:48


37.5 MG


 


 Mometasone


 Furoate/


 Formoterol Fumar


  (Dulera 100-5


 Mcg/Act)  2 puffs  BID


 INH  11/20/17 09:00


 12/20/17 08:59  11/20/17 07:49


2 PUFFS

## 2017-11-20 NOTE — DIAGNOSTIC IMAGING REPORT
CHEST ONE VIEW PORTABLE



HISTORY:      lateral rib pain



COMPARISON: Chest 10/10/2017.



FINDINGS: The lungs are clear. Persistent elevation/eventration of the right

hemidiaphragm. Small hiatus hernia. Heart is normal in size. No pleural

effusions. No pneumothorax.



IMPRESSION:

No significant change compared to the prior study. No acute process.







Electronically signed by:  JeanC-laude Archer M.D.

11/20/2017 7:50 AM



Dictated Date/Time:  11/20/2017 7:48 AM

## 2017-11-20 NOTE — EMERGENCY ROOM VISIT NOTE
History


Report prepared by Xuan:  Moni Yan


Under the Supervision of:  Dr. Lux Park M.D.


First contact with patient:  19:34


Chief Complaint:  ABDOMINAL PAIN


Stated Complaint:  AB PAIN


Nursing Triage Summary:  


Pt c/o abdomonal pain throughout, since this morning with nausea. Denies 


vomiting. Pt did have hernia repair month ago, denies complications with it.





History of Present Illness


The patient is an 86 year old female who presents to the Emergency Room with 

complaints of constant abdominal pain starting this morning. The patient states 

that it started in her lower abdomen and started to "creep" up her body. She 

describes the pain as an aching pain. She denies any vomiting, but notes that 

she is nauseous. The patient complains of a low grade fever of 99 degrees 

Fahrenheit. She notes that she intermittently has diarrhea and constipation, 

but states that she had a good normal bowel movement yesterday. The patient 

denies urinary symptoms. She notes that she is being treated for her 

hypertension.





   Source of History:  patient


   Onset:  morning


   Position:  abdomen


   Quality:  ache, other (creeping)


   Timing:  constant


   Associated Symptoms:  + fevers, + nausea, + diarrhea, No vomiting, No 

urinary symptoms


Note:


The patient complains of constipation.





Review of Systems


See HPI for pertinent positives & negatives. A total of 10 systems reviewed and 

were otherwise negative.





Past Medical & Surgical


Medical Problems:


(1) Acute Lyme disease


(2) Arthritis


(3) CAD (coronary artery disease)


(4) CLL (chronic lymphocytic leukemia)


(5) Degenerative disc disease


(6) Depression


(7) Epigastric abdominal pain


(8) HTN (hypertension)


(9) Non-allergic rhinitis


(10) Rib pain


Surgical Problems:


(1) History of tonsillectomy








Family History





No pertinent family history





Social History


Smoking Status:  Never Smoker


Alcohol Use:  none


Drug Use:  none


Marital Status:  


Housing Status:  lives with significant other


Occupation Status:  retired





Current/Historical Medications


Scheduled


Aspirin (Aspirin), 81 MG PO QPM


Atorvastatin (Lipitor), 40 MG PO QPM


Azelastine Hcl (Astelin Nasal Curtiss), 1 SPRAY JULIO BID


Bimatoprost (Lumigan), 1 DROP OPB HS


Calcium Carbonate-Vitamin D (Calcium 600 + D), 1 TABS PO QPM


Cholecalciferol (Vitamin D3), 2 TABS PO DAILY


Ferrous Sulfate (Ferrous Sulfate), 325 MG PO BID


Fluticasone Propionate (Nasal) (Flonase Allergy Relief), 2 SPRAYS JULIO DAILY 

AFTERNOON


Gabapentin (Neurontin), 300 MG PO HS


Lactobacillus-Inulin (Culturelle), 1 CAP PO QPM


Lamotrigine (Lamictal), 100 MG PO BID


Levothyroxine Sodium (Synthroid), 50 MCG PO DAILY


Lisinopril (Zestril), 10 MG PO QPM


Lorazepam (Ativan), 0.25 MG PO HS


Metoprolol Succinate (Toprol Xl), 25 MG PO QPM


Mometasone Furoate-Formoterol (Dulera 100/5 Mcg), 2 PUFFS INH Q12


Quetiapine Fumarate (Seroquel), 25 MG PO HS


Venlafaxine Hcl (Effexor Xr), 262.5 MG PO QAM





Scheduled PRN


Acetaminophen/Codeine (Tylenol W/Codeine #3), 1 TAB PO BID PRN for Pain


Epoetin Enmanuel (Procrit), 1 DOSE SC WK PRN for HEMOGLOBIN LEVEL < 12


Levalbuterol Tartrate (Levalbuterol Tartrate Hfa), 2 PUFFS INH Q4H PRN for PRN


Nitroglycerin (Nitrostat), 0.4 MG UT UD PRN for Chest Pain





Allergies


Coded Allergies:  


     Adhesives (Verified  Allergy, Intermediate, if on for a while-red blisters

, 11/19/17)


     Bacitracin (Verified  Allergy, Mild, ALLERGY-IRRITATION, 11/19/17)


     Neomycin (Verified  Allergy, Mild, ALLERGY, 11/19/17)


     Polymyxin B (Verified  Allergy, Mild, ALLERGY, 11/19/17)





Physical Exam


Vital Signs











  Date Time  Temp Pulse Resp B/P (MAP) Pulse Ox O2 Delivery O2 Flow Rate FiO2


 


11/19/17 23:06  83      


 


11/19/17 22:30  82 18 154/97 99 Room Air  


 


11/19/17 21:12  84 18 158/66 100 Room Air  


 


11/19/17 19:28  75      


 


11/19/17 19:20 37.2 85 18 181/79 95 Room Air  











Physical Exam





Constitutional: Vital signs reviewed.


Eyes: Pupils are equal round reactive to light.  Conjunctiva are noninjected.  


ENT: Pharynx is clear without erythema or exudate.  Mucous membranes are moist.

  Neck supple without meningeal signs.


Respiratory: Clear to auscultation bilaterally.  Breath sounds are equal 

bilaterally. 


Cardiovascular: Regular rate and rhythm.  No rubs or gallops.


GI: Soft, nondistended.  Bowel sounds are present. Lower abdominal tenderness. 

No guarding.


Musculoskeletal: No peripheral edema.   No CVA tenderness. 


Integumentary: No cyanosis.


Neurological: The patient is awake and alert.  No focal deficits.


Psychiatric: Normal affect.





Medical Decision & Procedures


ER Provider


Diagnostic Interpretation:


Radiology results as stated below per my review and the radiologist's 

interpretation:





ABDOMEN AND PELVIS CT WITH IV AND ORAL CONTRAST





CT DOSE: 236.20 mGy.cm





HISTORY:      lower abd pain eval for divertic





TECHNIQUE: Multiaxial CT images of the abdomen and pelvis were performed


following the use of intravenous and oral contrast.  A dose lowering technique


was utilized adhering to the principles of ALARA.





COMPARISON STUDY: Abdomen and pelvis CT 10/10/2017.





FINDINGS: Mild interstitial thickening at the lung bases which is likely


chronic. Moderate hiatus hernia, unchanged. The liver, spleen, adrenal glands,


pancreas, and kidneys are unremarkable. No gallbladder wall thickening.


Calcified mediastinal lymph nodes. The bladder is unremarkable. Small fat and


fluid containing right inguinal hernia. A pessary device is noted. No bowel


obstruction. Colonic diverticulosis. No retroperitoneal lymphadenopathy.


Adjacent to the cecal base there is a small focal hypodensity best seen on image


263 measuring 3 cm. This was not present on the prior study one month earlier.


This is adjacent to the normal caliber appendix. This is of uncertain clinical


significance and could represent a small focus of fluid or fluid-filled bowel.





IMPRESSION: 





1. No definite bowel wall thickening or obstruction.


2. Small fat and fluid containing right femoral hernia.


3. A 3 cm focal low density structure adjacent to the cecal base and normal


appendix. This was not present on the prior study and is of uncertain clinical


significance. This could represent a small focus of fluid or fluid-filled bowel.


4. Colonic diverticulosis.


5. Moderate hiatus hernia, unchanged. 











Electronically signed by:  Jean-Claude Archer M.D.


11/19/2017 10:54 PM





Dictated Date/Time:  11/19/2017 10:29 PM





Laboratory Results


11/19/17 19:44








Red Blood Count 4.13, Mean Corpuscular Volume 92.5, Mean Corpuscular Hemoglobin 

29.3, Mean Corpuscular Hemoglobin Concent 31.7, Mean Platelet Volume 9.9, 

Neutrophils (%) (Auto) 12.9, Lymphocytes (%) (Auto) 80.1, Monocytes (%) (Auto) 

4.3, Eosinophils (%) (Auto) 2.2, Basophils (%) (Auto) 0.3, Neutrophils # (Auto) 

4.16, Lymphocytes # (Auto) 25.70, Monocytes # (Auto) 1.38, Eosinophils # (Auto) 

0.69, Basophils # (Auto) 0.09





11/19/17 19:44

















Test


  11/19/17


19:44 11/19/17


20:50


 


White Blood Count


  32.09 K/uL


(4.8-10.8) 


 


 


Red Blood Count


  4.13 M/uL


(4.2-5.4) 


 


 


Hemoglobin


  12.1 g/dL


(12.0-16.0) 


 


 


Hematocrit 38.2 % (37-47)  


 


Mean Corpuscular Volume


  92.5 fL


() 


 


 


Mean Corpuscular Hemoglobin


  29.3 pg


(25-34) 


 


 


Mean Corpuscular Hemoglobin


Concent 31.7 g/dl


(32-36) 


 


 


Platelet Count


  189 K/uL


(130-400) 


 


 


Mean Platelet Volume


  9.9 fL


(7.4-10.4) 


 


 


Neutrophils (%) (Auto) 12.9 %  


 


Lymphocytes (%) (Auto) 80.1 %  


 


Monocytes (%) (Auto) 4.3 %  


 


Eosinophils (%) (Auto) 2.2 %  


 


Basophils (%) (Auto) 0.3 %  


 


Neutrophils # (Auto)


  4.16 K/uL


(1.4-6.5) 


 


 


Lymphocytes # (Auto)


  25.70 K/uL


(1.2-3.4) 


 


 


Monocytes # (Auto)


  1.38 K/uL


(0.11-0.59) 


 


 


Eosinophils # (Auto)


  0.69 K/uL


(0-0.5) 


 


 


Basophils # (Auto)


  0.09 K/uL


(0-0.2) 


 


 


RDW Standard Deviation


  45.6 fL


(36.4-46.3) 


 


 


RDW Coefficient of Variation


  13.5 %


(11.5-14.5) 


 


 


Immature Granulocyte % (Auto) 0.2 %  


 


Immature Granulocyte # (Auto)


  0.07 K/uL


(0.00-0.02) 


 


 


Smudge Cells PRESENT  


 


Prothrombin Time


  11.3 SECONDS


(9.0-12.0) 


 


 


Prothromb Time International


Ratio 1.1 (0.9-1.1) 


  


 


 


Activated Partial


Thromboplast Time 23.3 SECONDS


(21.0-31.0) 


 


 


Partial Thromboplastin Ratio 0.9  


 


Anion Gap


  10.0 mmol/L


(3-11) 


 


 


Est Creatinine Clear Calc


Drug Dose 30.0 ml/min 


  


 


 


Estimated GFR (


American) 56.3 


  


 


 


Estimated GFR (Non-


American 48.6 


  


 


 


BUN/Creatinine Ratio 13.2 (10-20)  


 


Calcium Level


  9.1 mg/dl


(8.5-10.1) 


 


 


Total Bilirubin


  0.3 mg/dl


(0.2-1) 


 


 


Direct Bilirubin


  < 0.1 mg/dl


(0-0.2) 


 


 


Aspartate Amino Transf


(AST/SGOT) 20 U/L (15-37) 


  


 


 


Alanine Aminotransferase


(ALT/SGPT) 24 U/L (12-78) 


  


 


 


Alkaline Phosphatase


  83 U/L


() 


 


 


Troponin I


  < 0.015 ng/ml


(0-0.045) 


 


 


Total Protein


  6.9 gm/dl


(6.4-8.2) 


 


 


Albumin


  3.8 gm/dl


(3.4-5.0) 


 


 


Lipase


  469 U/L


() 


 


 


Urine Color  YELLOW 


 


Urine Appearance  CLEAR (CLEAR) 


 


Urine pH  8.0 (4.5-7.5) 


 


Urine Specific Gravity


  


  1.006


(1.000-1.030)


 


Urine Protein  NEG (NEG) 


 


Urine Glucose (UA)  NEG (NEG) 


 


Urine Ketones  NEG (NEG) 


 


Urine Occult Blood  TRACE (NEG) 


 


Urine Nitrite  NEG (NEG) 


 


Urine Bilirubin  NEG (NEG) 


 


Urine Urobilinogen  NEG (NEG) 


 


Urine Leukocyte Esterase  LARGE (NEG) 


 


Urine WBC (Auto)  >30 /hpf (0-5) 


 


Urine RBC (Auto)  0-4 /hpf (0-4) 


 


Urine Hyaline Casts (Auto)  0 /lpf (0-5) 


 


Urine Epithelial Cells (Auto)


  


  5-10 /lpf


(0-5)


 


Urine Bacteria (Auto)  NEG (NEG) 





Laboratory results as reviewed by me.





Medications Administered











 Medications


  (Trade)  Dose


 Ordered  Sig/Debi


 Route  Start Time


 Stop Time Status Last Admin


Dose Admin


 


 Morphine Sulfate


  (MoRPHine


 SULFATE INJ)  4 mg  ONE  STAT


 IV  11/19/17 19:39


 11/19/17 19:41 DC 11/19/17 21:13


4 MG


 


 Ondansetron HCl


  (Zofran Inj)  4 mg  NOW  STAT


 IV  11/19/17 19:39


 11/19/17 19:41 DC 11/19/17 20:10


4 MG


 


 Morphine Sulfate


  (MoRPHine


 SULFATE INJ)  2 mg  NOW  STAT


 IV  11/19/17 23:12


 11/19/17 23:14 DC 11/19/17 23:22


2 MG











ECG


Indication:  abdominal pain


Rate (beats per minute):  78


Rhythm:  normal sinus


Findings:  Q waves (Inferior, V1), no ectopy


Comparison ECG Date:  Repeat


Change:


Repeat EKG: Normal sinus rhythm at a rate of 79. Persistent Q waves in V1 and 

Inferior leads. No ST segment elevations. No ectopy. No change from earlier 

today.





ED Course


1935: The patient was evaluated in room A12B. A complete history and physical 

exam was performed.





1939: Ordered Zofran Inj 4 mg IV, Morphine Sulfate 4 mg IV.





2047: I reevaluated the patient and talked to her about her test results. She 

is feeling better.





2311: I reevaluated the patient and discussed her test results withe her. She 

is still having some pain around her ribs.





2312: Ordered Morphine Sulfate 2 mg IV.





2314: I spoke with Dr. Robles of [facility]. We discussed the patient and her 

results. The patient will be further evaluated by Dr. Robles.





Medical Decision


This is an 86-year-old female presents with abdominal pain.  Differential 

diagnosis includes diverticulitis, perforation, abscess, appendicitis, UTI, 

irritable bowel syndrome, colitis.  I did perform a limited focused review of 

portions of the patient's old chart on the electronic medical record. The 

patient had a right inguinal hernia repair October 17th.





I did evaluate the patient as noted above.  She is presenting with lower 

abdominal pain now radiating into her lower chest.  She has tenderness in her 

lower abdomen to palpation.  No guarding.  IV access was established.  The 

patient was placed on a continuous cardiac monitor.  I did treat the patient 

with IV morphine and Zofran.  I did order and personally review the patient's 12

-lead EKG  as described above.  I did order and review the patient's blood work 

as noted in the electronic medical record.  She has significant elevation of 

her white blood cell count due to her CLL.  Her lipase is slightly elevated.  I 

did order a CT of the abdomen and pelvis.  I did review the images myself as 

well as the radiology report as described above.  She does have a right femoral 

hernia containing fat and fluid.  There was a hypodensity his cecum as well 

which may be fluid or bowel.  No appendicitis was noted.  I did reevaluate the 

patient.  She states that she feels better but her pain seems to be coming 

back.  She states its radiating into her lower chest.  I did repeat a twelve-

lead EKG which did not show any acute ischemic changes.  I did treat patient 

with additional morphine IV.  I did discuss the case with the hospitalist and 

 for further care.





Medication Reconcilliation


Current Medication List:  was personally reviewed by me





Blood Pressure Screening


Patient's blood pressure:  Elevated blood pressure


Blood pressure disposition:  Referred to PCP





Consults


Time Called:  2313


Consulting Physician:  Dr. Robles of [facility]


Returned Call:  2314


I spoke with Dr. Robles of [facility]. We discussed the patient and her results. 

The patient will be further evaluated by Dr. Robles.





Impression





 Primary Impression:  


 Diffuse abdominal pain


 Additional Impressions:  


 CLL (chronic lymphocytic leukemia)


 UTI (urinary tract infection)





Scribe Attestation


The scribe's documentation has been prepared under my direct and personally 

reviewed by me in its entirety. I confirm that the note above accurately 

reflects all work, treatment, procedures, and medical decision making performed 

by me.





Departure Information


Dispostion


Being Evaluated By Hospitalist





Referrals


Germán Do D.O. (PCP)





Patient Instructions


My [facility]





Problem Qualifiers








 Additional Impressions:  


 UTI (urinary tract infection)


 Urinary tract infection type:  acute cystitis  Hematuria presence:  without 

hematuria  Qualified Codes:  N30.00 - Acute cystitis without hematuria

## 2017-11-20 NOTE — DIAGNOSTIC IMAGING REPORT
THORACIC SPINE 2 VIEWS



CLINICAL HISTORY: Chronic thoracic back pain.



FINDINGS: AP and lateral views of the thoracic spine are correlated with chest

radiographs dated 11/08/2015. The skeletal structures are osteopenic. There is

no radiographic evidence of fracture or malalignment. Vertebral body height and

alignment are maintained throughout the thoracic spine. There is moderate to

severe levocurvature of the thoracolumbar junction. Hyperkyphosis is observed.

Anterior and lateral marginal osteophytes are seen throughout. The transverse

processes and pedicles are grossly intact as seen on the frontal view. Moderate

multilevel degenerative disc space narrowing is noted. The visualized lung

parenchyma appears clear noting chronic interstitial thickening. There is

atherosclerotic calcification of the thoracic aorta.



IMPRESSION:



1. No acute bony abnormality is seen involving the thoracic spine.



2. Osteopenia with spondylotic change and kyphoscoliosis as above.







Dictated:  11/20/2017 1:38 PM

Transcribed:  11/20/2017 1:46 PM

[email or web address]







Electronically signed by:  Tanmay Leslie M.D.

11/20/2017 1:49 PM



Dictated Date/Time:  11/20/2017 1:38 PM

## 2017-11-21 VITALS
OXYGEN SATURATION: 96 % | TEMPERATURE: 98.24 F | DIASTOLIC BLOOD PRESSURE: 62 MMHG | SYSTOLIC BLOOD PRESSURE: 154 MMHG | HEART RATE: 58 BPM

## 2017-11-21 VITALS
TEMPERATURE: 98.24 F | DIASTOLIC BLOOD PRESSURE: 67 MMHG | HEART RATE: 75 BPM | OXYGEN SATURATION: 95 % | SYSTOLIC BLOOD PRESSURE: 158 MMHG

## 2017-11-21 VITALS
SYSTOLIC BLOOD PRESSURE: 133 MMHG | HEART RATE: 81 BPM | TEMPERATURE: 98.24 F | OXYGEN SATURATION: 96 % | DIASTOLIC BLOOD PRESSURE: 93 MMHG

## 2017-11-21 VITALS
DIASTOLIC BLOOD PRESSURE: 67 MMHG | SYSTOLIC BLOOD PRESSURE: 158 MMHG | HEART RATE: 75 BPM | TEMPERATURE: 98.24 F | OXYGEN SATURATION: 95 %

## 2017-11-21 VITALS — OXYGEN SATURATION: 96 %

## 2017-11-21 VITALS — OXYGEN SATURATION: 95 %

## 2017-11-21 VITALS
TEMPERATURE: 98.06 F | SYSTOLIC BLOOD PRESSURE: 115 MMHG | OXYGEN SATURATION: 96 % | DIASTOLIC BLOOD PRESSURE: 57 MMHG | HEART RATE: 59 BPM

## 2017-11-21 LAB
ANION GAP SERPL CALC-SCNC: 7 MMOL/L (ref 3–11)
BASOPHILS # BLD: 0.07 K/UL (ref 0–0.2)
BASOPHILS NFR BLD: 0.3 %
BUN SERPL-MCNC: 9 MG/DL (ref 7–18)
BUN/CREAT SERPL: 9.1 (ref 10–20)
CALCIUM SERPL-MCNC: 8.5 MG/DL (ref 8.5–10.1)
CHLORIDE SERPL-SCNC: 106 MMOL/L (ref 98–107)
CO2 SERPL-SCNC: 28 MMOL/L (ref 21–32)
COMPLETE: YES
CREAT CL PREDICTED SERPL C-G-VRATE: 30.8 ML/MIN
CREAT SERPL-MCNC: 1.01 MG/DL (ref 0.6–1.2)
EOSINOPHIL NFR BLD AUTO: 141 K/UL (ref 130–400)
GLUCOSE SERPL-MCNC: 115 MG/DL (ref 70–99)
HCT VFR BLD CALC: 38.1 % (ref 37–47)
IG%: 0.1 %
IMM GRANULOCYTES NFR BLD AUTO: 84.2 %
LYMPHOCYTES # BLD: 18.76 K/UL (ref 1.2–3.4)
MAGNESIUM SERPL-MCNC: 2.3 MG/DL (ref 1.8–2.4)
MCH RBC QN AUTO: 28.6 PG (ref 25–34)
MCHC RBC AUTO-ENTMCNC: 30.4 G/DL (ref 32–36)
MCV RBC AUTO: 93.8 FL (ref 80–100)
MONOCYTES NFR BLD: 4.1 %
NEUTROPHILS # BLD AUTO: 3 %
NEUTROPHILS NFR BLD AUTO: 8.3 %
PMV BLD AUTO: 9.3 FL (ref 7.4–10.4)
POTASSIUM SERPL-SCNC: 4.1 MMOL/L (ref 3.5–5.1)
RBC # BLD AUTO: 4.06 M/UL (ref 4.2–5.4)
SMUDGE CELLS # BLD: PRESENT 10*3/UL
SODIUM SERPL-SCNC: 141 MMOL/L (ref 136–145)
WBC # BLD AUTO: 22.28 K/UL (ref 4.8–10.8)

## 2017-11-21 RX ADMIN — CEFTRIAXONE SODIUM SCH MLS/HR: 1 INJECTION, POWDER, FOR SOLUTION INTRAVENOUS at 01:25

## 2017-11-21 RX ADMIN — TRAMADOL HYDROCHLORIDE SCH MG: 50 TABLET, COATED ORAL at 13:57

## 2017-11-21 RX ADMIN — VENLAFAXINE HYDROCHLORIDE SCH MG: 37.5 CAPSULE, EXTENDED RELEASE ORAL at 07:56

## 2017-11-21 RX ADMIN — VENLAFAXINE HYDROCHLORIDE SCH MG: 75 CAPSULE, EXTENDED RELEASE ORAL at 07:56

## 2017-11-21 RX ADMIN — ACETAMINOPHEN SCH MG: 500 TABLET, COATED ORAL at 06:00

## 2017-11-21 RX ADMIN — ALUMINUM ZIRCONIUM TRICHLOROHYDREX GLY SCH EA: 0.2 STICK TOPICAL at 07:33

## 2017-11-21 RX ADMIN — LEVOTHYROXINE SODIUM SCH MCG: 50 TABLET ORAL at 06:00

## 2017-11-21 RX ADMIN — TRAMADOL HYDROCHLORIDE SCH MG: 50 TABLET, COATED ORAL at 06:01

## 2017-11-21 RX ADMIN — FLUTICASONE PROPIONATE SCH SPRAYS: 50 SPRAY, METERED NASAL at 07:55

## 2017-11-21 RX ADMIN — FERROUS SULFATE TAB EC 325 MG (65 MG FE EQUIVALENT) SCH MG: 325 (65 FE) TABLET DELAYED RESPONSE at 07:56

## 2017-11-21 RX ADMIN — ACETAMINOPHEN SCH MG: 500 TABLET, COATED ORAL at 13:57

## 2017-11-21 RX ADMIN — VENLAFAXINE HYDROCHLORIDE SCH MG: 150 CAPSULE, EXTENDED RELEASE ORAL at 07:55

## 2017-11-21 NOTE — CLINICAL DOCUMENTATION QUERY
**** CLINICAL DOCUMENTATION QUERY****



Dr. SINGH, 



In your clinical opinion is this patient being managed for:

    

                        (  ) Degenerative disc disease likely causing back pain

                        ( x ) Not Agree



                        ( x ) Other explanation of clinical findings (Please Explain)  UTI

                        (  ) Unable to determine (Please Define)

                        (  ) Need to Discuss

                        



The medical record reflects the following clinical findings, treatment, and risk factors.  
  



Clinical Indicators:87 yo female presenting with abdominal and worsening back pain. L 
spine showed Moderate multilevel degenerative change. 

Treatment: pain management, considering outpatient PT 

Risk Factors: age, degenerative disc disease



Please clarify and document your clinical opinion in the progress notes and discharge 
summary. Terms such as "probable", "suspected", "likely", "questionable", "possible", or 
"still to be ruled out" are acceptable. 



*****IF IN AGREEMENT, YOU MUST DOCUMENT ABOVE DIAGNOSTIC STATEMENT IN DAILY PROGRESS NOTES 
AND DISCHARGE SUMMARY. This document is not part of the patient's record.*****

Thank You, Monse Carrera, [phone]

## 2017-11-21 NOTE — DISCHARGE SUMMARY
Discharge Summary


Date of Service


Nov 21, 2017.





Discharge Summary


Admission Date:


Nov 20, 2017 at 13:24


Discharge Date:  Nov 21, 2017


Discharge Disposition:  Home


Principal Diagnosis:


urinary tract infection (present on admission)





OTHER ACUTE DIAGNOSES:


abdominal pain


back pain


.


Secondary Diagnoses/Problems:





Chronic and Resolved Medical Problems:





(1) Chronic lymphocytic leukemia


Status: Chronic  





(2) Coronary artery disease


Status: Chronic  





(3) Degenerative disc disease


Status: Chronic  





(4) Depression


Status: Chronic  





(5) Hypertension


Status: Chronic  





(6) Hypothyroidism


Status: Chronic  








Surgical Problems:





(1) History of tonsillectomy


Status: Chronic  


.





Procedures:


CT abdomen + pelvis


IV meds


.


Pending Studies/Follow-Up:





Urine culture obtained 11/19/17 pending at time of discharge.


.





Medication Reconciliation


New Medications:  


Cephalexin Monohydrate (Cephalexin) 500 Mg Cap


500 MG PO TID, #15 CAP





 


Continued Medications:  


Acetaminophen/Codeine (Tylenol W/Codeine #3) 300 Mg/30 Mg Tab


1 TAB PO BID PRN for Pain, TAB





Aspirin (Aspirin) 81 Mg Tab


81 MG PO QPM





Atorvastatin (Lipitor) 40 Mg Tab


40 MG PO QPM





Azelastine Hcl (Astelin Nasal Spray) 200 Sprays/30 Ml [place]


1 SPRAY JULIO BID





Bimatoprost (Lumigan) 0.01 % Sol


1 DROP OPB HS, ML





Calcium Carbonate-Vitamin D (Calcium 600 + D) 1 Tab Tab


1 TABS PO QPM





Cholecalciferol (Vitamin D3) 1,000 Unit Tab


2 TABS PO DAILY





Epoetin Enmanuel (Procrit) 40,000 Units Inj


1 DOSE SC WK PRN for HEMOGLOBIN LEVEL < 12


FOR HEMOGLOBIN LEVEL < 12 [NOT NEEDED FOR SEVERAL MONTHS]


Ferrous Sulfate (Ferrous Sulfate) 325 Mg Tab


325 MG PO BID





Fluticasone Propionate (Nasal) (Flonase Allergy Relief) 50 Mcg/Act Spr


2 SPRAYS JULIO DAILY AFTERNOON





Gabapentin (Neurontin) 300 Mg Cap


300 MG PO HS, 0 Refills





Lactobacillus-Inulin (Culturelle) 1 Cap Cap


1 CAP PO QPM





Lamotrigine (Lamictal) 100 Mg Tab


100 MG PO BID, TAB





Levalbuterol Tartrate (Levalbuterol Tartrate Hfa) 45 Mcg/Act Aer


2 PUFFS INH Q4H PRN for PRN





Levothyroxine Sodium (Levothyroxine Sodium) 75 Mcg Tab


75 MG PO DAILY





Lisinopril (Zestril) 10 Mg Tab


10 MG PO QPM





Lorazepam (Ativan) 0.5 Mg Tab


0.25 MG PO HS





Metoprolol Succinate (Toprol Xl) 25 Mg Tab


25 MG PO QPM





Mometasone Furoate-Formoterol (Dulera 100/5 Mcg) 1 Aer Aer


2 PUFFS INH Q12, 2 Refills





Nitroglycerin (Nitrostat) 0.4 Mg Tab


0.4 MG UT UD PRN for Chest Pain, 0 Refills


PLACE ONE TABLET UNDER THE TONGUE EVERY 5 MINUTES FOR UP TO 3 DOSES


 IF NEEDED FOR CHEST PAIN.


Quetiapine Fumarate (Seroquel) 25 Mg Tab


25 MG PO HS





Venlafaxine Hcl (Effexor Xr) 75 Mg Cap


262.5 MG PO QAM


TAKE 150 MG + 75 MG + 37.5 MG FOR TOTAL .5 MG EACH MORNING


Venlafaxine Hcl (Effexor Xr) 150 Mg Cap


150 MG PO DAILY


TAKE 150 MG + 75 MG + 37.5 MG FOR TOTAL .5 MG EACH MORNING


Venlafaxine Hcl (Effexor Xr) 37.5 Mg Cap


37.5 MG PO DAILY


TAKE 150 MG + 75 MG + 37.5 MG FOR TOTAL .5 MG EACH MORNING








Admission Information


HPI (per Admitting provider):


This is an 86 year old female with a PMH of CLL, CAD, HTN, CKD stage 3, asthma 

- presents to the hospital due to suprapubic pain that radiated to her 

bilateral ribs. She states that she has chronic back pain and takes tylenol #3 

for it - she states that the back pain was worsening the past few days and she 

had to use a massager for it. She then developed this abdominal pain that began 

in her suprapubic region. Denied urinary symptoms. The pain then shifted over 

to her lateral ribs on both sides. She did not have difficulty breathing; but 

states the pain seemed similar to when she fractured her ribs in the past. When 

she got to the ER, the pain was at her epigastric region. Denies n/v/d/

constipation, denies dysuria/frequency, denies fevers/chills, denies chest pain/

shortness of breath.





Received IV morphine in the ED with some relief of the pain.


.


Physical Exam (per Admitting):  


   General Appearance:  no apparent distress


   Head:  normocephalic, atraumatic


   Eyes:  normal inspection


   ENT:  hearing grossly normal


   Neck:  supple


   Respiratory/Chest:  lungs clear, normal breath sounds, no respiratory 

distress, no accessory muscle use, + pertinent finding (+lateral rib tenderness)


   Cardiovascular:  regular rate, rhythm, no edema, no murmur


   Abdomen/GI:  normal bowel sounds, soft, + tenderness (epigastric tenderness 

to palpation)


   Extremities/Musculoskelatal:  normal inspection, no calf tenderness, normal 

capillary refill, no pedal edema, normal range of motion


   Neurologic/Psych:  CNs II-XII nml as tested, no motor/sensory deficits, alert

, normal mood/affect, oriented x 3


   Skin:  normal color


   Lymphatic:  no adenopathy





Hospital Course





PROBABLE UTI


Presented with back / abdominal pain.


UA showed leukocyte esterase, many WBC's, no bacteria.


CT abdomen and pelvis did not show any urinary calculi.


No dysuria.


Had at least 1 low-grade temp.


WBC chronically elevated due to CLL.


Received IV ceftriaxone with improvement of back and abdominal pain.


Urine culture growing gram negative bacilli.


Discharge on cephalexin x 5 days to complete 7 day course of therapy.





BACK / ABDOMINAL PAIN


Plain films of spine showed degenerative disease.


LFT's normal.


Lipase slightly elevated, promptly improved.


CT of abdomen + pelvis showed diverticulosis without diverticulitis or other 

acute findings.


Treated for suspected UTI as discussed above with improvement.





CLL


WBC 31,090 at time of admission.


WBC 22,280 day of discharge.


Management per Heme / Onc.





CAD


No anginal symptoms.


Cardiac markers negative.


Continue ASA, metoprolol, statin.





HYPERTENSION


BP well-controlled.


Continue metoprolol and lisinopril.





HYPOTHYROIDISM


Continue levothyroxine.





CHRONIC BACK PAIN


Continue usual analgesic regimen.





VTE PROPHYLAXIS


Received enoxaparin.


Ambulating.





DISPOSITION


Discharge to home.


Internal Medicine follow-up with Dr. Do.


.


Total time spent on discharge = 40 min.


This includes examination of the patient, discharge planning, medication 

reconciliation, and communication with other providers.


.





Discharge Instructions





Date of Service


Nov 21, 2017.





Admission


Reason for Admission:  abdominal and back pain


.





Discharge


Discharge Diagnosis / Problem:  probable urinary tract infection





Discharge Goals


Goal(s):  Decrease discomfort, Improve function, Improve disease control





Activity Recommendations


Activity Limitations:  resume your previous activity





.





Instructions / Follow-Up


Instructions / Follow-Up





APPOINTMENTS:





INTERNAL MEDICINE


11/27/2017 11:20 AM 


Germán Do, DO











OTHER INSTRUCTIONS:





It appears that you have a urinary tract infection.


Final from urine culture pending.


Prescription for cephalexin (Keflex) 500 mg 3 times a day sent to [facility].





Seek medical attention if you have:


*  temperature above 101


*  chest pain or trouble breathing


*  abdominal pain, nausea, vomiting


*  diarrhea, dark stools or bloody stools


*  burning when you urinate or blood in your stools


*  any unanswered questions or concerns





Call 911 if symptoms are severe.





Call if you have any questions or problems.


My cell # is [phone].





You can also reach a [facility] hospitalist on duty at [facility] 24 hours a day by calling [phone].





Please take good care of yourself.





Liborio Dennison


.





Current Hospital Diet


Patient's current hospital diet: AHA Diet (Heart Healthy)





Discharge Diet


Recommended Diet:  AHA Diet (Heart Healthy)





Procedures


Procedures Performed:  


X-rays of spine showed arthritis.


CT scan of abdomen and pelvis showed diverticulosis, but no


diverticultitis.





Pending Studies


Studies pending at discharge:  yes


List of pending studies:  


final report on urine culture





Medical Emergencies








.


Who to Call and When:





Medical Emergencies:  If at any time you feel your situation is an emergency, 

please call 911 immediately.





.





Non-Emergent Contact


Non-Emergency issues call your:  Primary Care Provider, Hospital Doctor





.


.








"Provider Documentation" section prepared by Liborio Dennison.








.





VTE Core Measure


Inpt VTE Proph given/why not?:  Enoxaparin (Lovenox)SQ


.

## 2017-11-21 NOTE — PROGRESS NOTE
Medicine Progress Note


Date & Time of Visit:


Nov 21, 2017 at 15:58


.


Subjective





Doing well.


No fever.


No chest pain.


No abdominal pain, nausea, vomiting, diarrhea.


Back pain now = chronic baseline.


No dysuria or hematuria.


Ambulating.


Ready to go home.


.





Objective





Last 8 Hrs








  Date Time  Temp Pulse Resp B/P (MAP) Pulse Ox O2 Delivery O2 Flow Rate FiO2


 


11/21/17 12:00     95 Room Air  


 


11/21/17 11:26 36.8 75 20 158/67 (97) 95 Room Air  








Physical Exam:





General- no distress


Eyes- anicteric


Neck- no JVD


Lungs- clear


Heart- RRR, no gallop


Abdomen- + BS, soft, nontender 


Extremities- no pretibial edema or calf tenderness 


Neuro- alert


.


Laboratory Results:





Last 24 Hours








Test


  11/21/17


06:04


 


White Blood Count 22.28 K/uL 


 


Red Blood Count 4.06 M/uL 


 


Hemoglobin 11.6 g/dL 


 


Hematocrit 38.1 % 


 


Mean Corpuscular Volume 93.8 fL 


 


Mean Corpuscular Hemoglobin 28.6 pg 


 


Mean Corpuscular Hemoglobin


Concent 30.4 g/dl 


 


 


Platelet Count 141 K/uL 


 


Mean Platelet Volume 9.3 fL 


 


Neutrophils (%) (Auto) 8.3 % 


 


Lymphocytes (%) (Auto) 84.2 % 


 


Monocytes (%) (Auto) 4.1 % 


 


Eosinophils (%) (Auto) 3.0 % 


 


Basophils (%) (Auto) 0.3 % 


 


Neutrophils # (Auto) 1.84 K/uL 


 


Lymphocytes # (Auto) 18.76 K/uL 


 


Monocytes # (Auto) 0.92 K/uL 


 


Eosinophils # (Auto) 0.67 K/uL 


 


Basophils # (Auto) 0.07 K/uL 


 


RDW Standard Deviation 47.0 fL 


 


RDW Coefficient of Variation 13.7 % 


 


Immature Granulocyte % (Auto) 0.1 % 


 


Immature Granulocyte # (Auto) 0.02 K/uL 


 


Smudge Cells PRESENT 


 


Sodium Level 141 mmol/L 


 


Potassium Level 4.1 mmol/L 


 


Chloride Level 106 mmol/L 


 


Carbon Dioxide Level 28 mmol/L 


 


Anion Gap 7.0 mmol/L 


 


Blood Urea Nitrogen 9 mg/dl 


 


Creatinine 1.01 mg/dl 


 


Est Creatinine Clear Calc


Drug Dose 30.8 ml/min 


 


 


Estimated GFR (


American) 58.4 


 


 


Estimated GFR (Non-


American 50.4 


 


 


BUN/Creatinine Ratio 9.1 


 


Random Glucose 115 mg/dl 


 


Calcium Level 8.5 mg/dl 


 


Magnesium Level 2.3 mg/dl 











Assessment & Plan





PROBABLE UTI


Presented with back / abdominal pain.


UA showed leukocyte esterase, many WBC's, no bacteria.


CT abdomen and pelvis did not show any urinary calculi.


No dysuria.


Had at least 1 low-grade temp.


WBC chronically elevated due to CLL.


Received IV ceftriaxone with improvement of back and abdominal pain.


Urine culture growing gram negative bacilli.


Discharge on cephalexin x 5 days to complete 7 day course of therapy.





BACK / ABDOMINAL PAIN


Plain films of spine showed degenerative disease.


LFT's normal.


Lipase slightly elevated, promptly improved.


CT of abdomen + pelvis showed diverticulosis without diverticulitis or other 

acute findings.


Treated for suspected UTI as discussed above with improvement.





CLL


WBC 31,090 at time of admission.


WBC 22,280 day of discharge.


Management per Heme / Onc.





CAD


No anginal symptoms.


Cardiac markers negative.


Continue ASA, metoprolol, statin.





HYPERTENSION


BP well-controlled.


Continue metoprolol and lisinopril.





HYPOTHYROIDISM


Continue levothyroxine.





CHRONIC BACK PAIN


Continue usual analgesic regimen.





VTE PROPHYLAXIS


Received enoxaparin.


Ambulating.





DISPOSITION


Discharge to home.


Internal Medicine follow-up with Dr. Do.


.


Current Inpatient Medications:





Current Inpatient Medications








 Medications


  (Trade)  Dose


 Ordered  Sig/Debi


 Route  Start Time


 Stop Time Status Last Admin


Dose Admin


 


 Ioversol


  (Optiray 320)  100 ml  UD  PRN


 IV  11/19/17 20:30


 11/23/17 20:29   


 


 


 Aspirin


  (Ecotrin Tab)  81 mg  QPM


 PO  11/20/17 21:00


 12/20/17 20:59  11/20/17 20:47


81 MG


 


 Atorvastatin


 Calcium


  (Lipitor Tab)  40 mg  QPM


 PO  11/20/17 21:00


 12/20/17 20:59  11/20/17 20:46


40 MG


 


 Ferrous Sulfate


  (Feosol Tab)  325 mg  BID


 PO  11/20/17 09:00


 12/20/17 08:59  11/21/17 07:56


325 MG


 


 Fluticasone


 Propionate


  (Flonase Nasal


 Spray)  2 sprays  DAILY


 JULIO  11/20/17 09:00


 12/20/17 08:59  11/21/17 07:55


2 SPRAYS


 


 Gabapentin


  (Neurontin Cap)  300 mg  HS


 PO  11/20/17 21:00


 12/20/17 20:59  11/20/17 20:47


300 MG


 


 Lamotrigine


  (Lamictal Tab)  100 mg  BID


 PO  11/20/17 09:00


 12/20/17 08:59  11/21/17 07:56


100 MG


 


 Levalbuterol


  (Xopenex Hfa


 Inhaler)  2 puffs  Q4H  PRN


 INH  11/19/17 23:45


 12/19/17 23:44   


 


 


 Levothyroxine


 Sodium


  (Synthroid Tab)  50 mcg  DAILYBB


 PO  11/20/17 06:30


 12/20/17 06:29  11/21/17 06:00


50 MCG


 


 Lisinopril


  (Zestril Tab)  10 mg  QPM


 PO  11/20/17 21:00


 12/20/17 20:59  11/20/17 20:45


10 MG


 


 Lorazepam


  (Ativan Tab)  0.25 mg  HS


 PO  11/20/17 21:00


 12/20/17 20:59  11/20/17 20:43


0.25 MG


 


 Metoprolol


 Succinate


  (Toprol Xl Tab)  25 mg  QPM


 PO  11/20/17 21:00


 12/20/17 20:59  11/20/17 20:45


25 MG


 


 Quetiapine


 Fumarate


  (seroQUEL TAB)  25 mg  HS


 PO  11/20/17 21:00


 12/20/17 20:59  11/20/17 20:45


25 MG


 


 Venlafaxine HCl


  (effeXOR


 EXTENDED REL CAP)  75 mg  QAM


 PO  11/20/17 09:00


 12/20/17 08:59  11/21/17 07:56


75 MG


 


 Miscellaneous


 Information


  (Order Awaiting


 Action)  1 ea  QS


 N/A  11/20/17 08:00


 12/20/17 07:59   


 


 


 Bimatoprost


  (Lumigan 0.01%)  1 drops  HS


 OPB  11/20/17 21:00


 12/20/17 20:59  11/20/17 20:42


1 DROPS


 


 Calcium/Vitamin D


  (Caltrate Plus


 Tab)  1 tab  QPM


 PO  11/20/17 21:00


 12/20/17 20:59  11/20/17 20:47


1 TAB


 


 Lactobacillus


 Acidophilus


  (Floranex Tab)  1 tab  QPM


 PO  11/20/17 21:00


 12/20/17 20:59  11/20/17 20:46


1 TAB


 


 Morphine Sulfate


  (MoRPHine


 SULFATE INJ)  2 mg  Q4  PRN


 IV  11/19/17 23:45


 12/3/17 23:44   


 


 


 Acetaminophen/


 Hydrocodone Bitart


  (Norco 5/325 Tab)  1 tab  Q4  PRN


 PO  11/19/17 23:45


 12/3/17 23:44  11/20/17 17:24


1 TAB


 


 Ceftriaxone


 Sodium 1 gm/


 Dextrose  50 ml @ 


 100 mls/hr  Q24H


 IV  11/20/17 01:00


 11/25/17 00:59  11/21/17 01:25


100 MLS/HR


 


 Acetaminophen


  (Tylenol Tab)  650 mg  Q4H  PRN


 PO  11/19/17 23:45


 12/19/17 23:44   


 


 


 Al Hydrox/Mg


 Hydrox/Simethicone


  (Maalox Max Susp)  15 ml  Q4H  PRN


 PO  11/19/17 23:45


 12/19/17 23:44   


 


 


 Magnesium


 Hydroxide


  (Milk Of


 Magnesia Susp)  30 ml  Q12H  PRN


 PO  11/19/17 23:45


 12/19/17 23:44  11/21/17 11:42


30 ML


 


 Miscellaneous


  (Iv Fluids


 Completed)  1 ea  PRN  PRN


 N/A  11/20/17 00:15


 11/20/18 00:14   


 


 


 Venlafaxine HCl


  (effeXOR


 EXTENDED REL CAP)  150 mg  QAM


 PO  11/20/17 09:00


 12/20/17 08:59  11/21/17 07:55


150 MG


 


 Venlafaxine HCl


  (effeXOR


 EXTENDED REL CAP)  37.5 mg  QAM


 PO  11/20/17 09:00


 12/20/17 08:59  11/21/17 07:56


37.5 MG


 


 Mometasone


 Furoate/


 Formoterol Fumar


  (Dulera 100-5


 Mcg/Act)  2 puffs  BID


 INH  11/20/17 09:00


 12/20/17 08:59  11/21/17 07:55


2 PUFFS


 


 Enoxaparin Sodium


  (Lovenox Inj)  30 mg  QAM


 SC  11/21/17 09:00


 12/21/17 08:59   


 


 


 Acetaminophen


  (Tylenol Tab)  1,000 mg  Q8


 PO  11/20/17 22:00


 12/20/17 21:59  11/21/17 06:00


1,000 MG


 


 Tramadol HCl


  (Ultram Tab)  50 mg  Q8H


 PO  11/20/17 22:00


 11/22/17 21:59  11/21/17 06:01


50 MG

## 2017-11-21 NOTE — DISCHARGE INSTRUCTIONS
Discharge Instructions


Date of Service


Nov 21, 2017.





Admission


Reason for Admission:  abdominal and back pain


.





Discharge


Discharge Diagnosis / Problem:  probable urinary tract infection





Discharge Goals


Goal(s):  Decrease discomfort, Improve function, Improve disease control





Activity Recommendations


Activity Limitations:  resume your previous activity





.





Instructions / Follow-Up


Instructions / Follow-Up





APPOINTMENTS:





INTERNAL MEDICINE


11/27/2017 11:20 AM 


Germán Do, DO











OTHER INSTRUCTIONS:





It appears that you have a urinary tract infection.


Final from urine culture pending.


Prescription for cephalexin (Keflex) 500 mg 3 times a day sent to [facility].





Seek medical attention if you have:


*  temperature above 101


*  chest pain or trouble breathing


*  abdominal pain, nausea, vomiting


*  diarrhea, dark stools or bloody stools


*  burning when you urinate or blood in your stools


*  any unanswered questions or concerns





Call 911 if symptoms are severe.





Call if you have any questions or problems.


My cell # is [phone].





You can also reach a [facility] hospitalist on duty at [facility] 24 hours a day by calling [phone].





Please take good care of yourself.





Liborio Dennison


.





Current Hospital Diet


Patient's current hospital diet: AHA Diet (Heart Healthy)





Discharge Diet


Recommended Diet:  AHA Diet (Heart Healthy)





Procedures


Procedures Performed:  


X-rays of spine showed arthritis.


CT scan of abdomen and pelvis showed diverticulosis, but no


diverticultitis.





Pending Studies


Studies pending at discharge:  yes


List of pending studies:  


final report on urine culture





Medical Emergencies








.


Who to Call and When:





Medical Emergencies:  If at any time you feel your situation is an emergency, 

please call 911 immediately.





.





Non-Emergent Contact


Non-Emergency issues call your:  Primary Care Provider, Hospital Doctor





.


.








"Provider Documentation" section prepared by Liborio Dennison.








.





VTE Core Measure


Inpt VTE Proph given/why not?:  Enoxaparin (Lovenox)SQ

## 2017-11-22 ENCOUNTER — HOSPITAL ENCOUNTER (EMERGENCY)
Dept: HOSPITAL 45 - C.EDB | Age: 82
Discharge: HOME | End: 2017-11-22
Payer: COMMERCIAL

## 2017-11-22 VITALS
BODY MASS INDEX: 25.24 KG/M2 | BODY MASS INDEX: 25.24 KG/M2 | WEIGHT: 125.22 LBS | HEIGHT: 59.02 IN | WEIGHT: 125.22 LBS | HEIGHT: 59.02 IN

## 2017-11-22 VITALS
DIASTOLIC BLOOD PRESSURE: 76 MMHG | SYSTOLIC BLOOD PRESSURE: 154 MMHG | HEART RATE: 99 BPM | OXYGEN SATURATION: 96 % | TEMPERATURE: 97.34 F

## 2017-11-22 DIAGNOSIS — E03.9: ICD-10-CM

## 2017-11-22 DIAGNOSIS — I25.10: ICD-10-CM

## 2017-11-22 DIAGNOSIS — F32.9: ICD-10-CM

## 2017-11-22 DIAGNOSIS — Z82.49: ICD-10-CM

## 2017-11-22 DIAGNOSIS — I10: ICD-10-CM

## 2017-11-22 DIAGNOSIS — R19.7: Primary | ICD-10-CM

## 2017-11-22 DIAGNOSIS — Z85.6: ICD-10-CM

## 2017-11-22 DIAGNOSIS — K92.1: ICD-10-CM

## 2017-11-22 DIAGNOSIS — Z79.899: ICD-10-CM

## 2018-03-18 ENCOUNTER — HOSPITAL ENCOUNTER (INPATIENT)
Dept: HOSPITAL 45 - C.EDB | Age: 83
LOS: 3 days | Discharge: HOME HEALTH | DRG: 563 | End: 2018-03-21
Attending: HOSPITALIST | Admitting: HOSPITALIST
Payer: COMMERCIAL

## 2018-03-18 VITALS
WEIGHT: 135.58 LBS | WEIGHT: 135.58 LBS | BODY MASS INDEX: 26.62 KG/M2 | HEIGHT: 60 IN | BODY MASS INDEX: 26.62 KG/M2 | HEIGHT: 60 IN

## 2018-03-18 DIAGNOSIS — E03.9: ICD-10-CM

## 2018-03-18 DIAGNOSIS — S43.001A: Primary | ICD-10-CM

## 2018-03-18 DIAGNOSIS — I73.9: ICD-10-CM

## 2018-03-18 DIAGNOSIS — Z82.49: ICD-10-CM

## 2018-03-18 DIAGNOSIS — C95.10: ICD-10-CM
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
## 2018-03-18 DIAGNOSIS — I10: ICD-10-CM

## 2018-03-18 DIAGNOSIS — I25.10: ICD-10-CM
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
## 2018-03-18 DIAGNOSIS — J45.909: ICD-10-CM

## 2018-03-18 DIAGNOSIS — S42.141A: ICD-10-CM
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
## 2018-03-18 DIAGNOSIS — M25.011: ICD-10-CM

## 2018-03-18 DIAGNOSIS — D64.9: ICD-10-CM

## 2018-03-18 DIAGNOSIS — Z79.82: ICD-10-CM

## 2018-03-18 DIAGNOSIS — S42.251A: ICD-10-CM

## 2018-03-18 NOTE — EMERGENCY ROOM VISIT NOTE
History


Report prepared by Xuan:  Gemma Lewis


Under the Supervision of:  Dr. Aditya Jacobson M.D.


First contact with patient:  19:51


Chief Complaint:  FALL


Stated Complaint:  FALL/ R SHOULDER & ELBOW PAIN





History of Present Illness


The patient is an 86 year old female who presents to the Emergency Room with 

complaints of an episodic fall 1 hour ago. She states that she went to help her 

neighbor who fell, though she was walking too fast and she fell. She reports 

injuring her right shoulder. She currently rates her pain an 8/10 in severity. 

She states that her right elbow feels uncomfortable, though the pain is coming 

from her right shoulder. She denies any LOC or head injuries. She states that 

she can hardly squeeze three of her fingers on her right hand due to weakness. 

She denies any numbness in her right hand. She has a history of HTN and CLL. 

She states that she often has low hemoglobin. She denies any neck pain, 

abdominal pain, chest pain, or shortness of breath.





   Source of History:  patient, spouse/significant other


   Onset:  one hour ago


   Position:  shoulder (right)


   Symptom Intensity:  8/10


   Quality:  other (immobility)


   Timing:  other (episodic)


   Associated Symptoms:  + weakness (right hand), No LOC, No neck pain, No 

chest pain, No SOB, No abdominal pain, No numbness


Note:


She denies any head injuries. 


She notes right elbow discomfort.





Review of Systems


See HPI for pertinent positives & negatives. A total of 10 systems reviewed and 

were otherwise negative.





Past Medical & Surgical


Medical Problems:


(1) Acute Lyme disease


(2) Arthritis


(3) Back pain


(4) Bronchitis


(5) C. difficile colitis


(6) Chronic lymphocytic leukemia


(7) Coronary artery disease


(8) Degenerative disc disease


(9) Depression


(10) Epigastric abdominal pain


(11) Hiatal hernia


(12) Hypertension


(13) Hypothyroidism


(14) Lyme disease


(15) Rib pain


Surgical Problems:


(1) H/O dilation and curettage


(2) H/O hernia repair


(3) History of breast mammoplasty


(4) History of cataract surgery


(5) History of tonsillectomy





Old medical records were reviewed. Nurse's notes were reviewed and I agree with.





Family History





No pertinent family history





Social History


Smoking Status:  Never Smoker


Alcohol Use:  none


Drug Use:  none


Marital Status:  


Housing Status:  lives with significant other


Occupation Status:  retired





Current/Historical Medications


Scheduled


Aspirin (Aspirin), 81 MG PO QPM


Atorvastatin (Lipitor), 40 MG PO QPM


Azelastine Hcl (Astelin Nasal [place]), 1 SPRAY JULIO BID


Bimatoprost (Lumigan), 1 DROP OPB HS


Calcium Carbonate-Vitamin D (Calcium 600 + D), 1 TABS PO QPM


Cholecalciferol (Vitamin D3), 2 TABS PO DAILY


Estrogens, Conjugated (Premarin), 1 APPLN PV 2-3xweek


Ferrous Sulfate (Ferrous Sulfate), 325 MG PO BID


Fluticasone Propionate (Nasal) (Flonase Allergy Relief), 2 SPRAYS JULIO DAILY 

AFTERNOON


Gabapentin (Neurontin), 300 MG PO HS


Lactobacillus-Inulin (Culturelle), 1 CAP PO QPM


Lamotrigine (Lamictal), 100 MG PO BID


Levothyroxine Sodium (Levothyroxine Sodium), 75 MG PO DAILY


Lisinopril (Zestril), 10 MG PO QPM


Lorazepam (Ativan), 0.25 MG PO HS


Metoprolol Succinate (Toprol Xl), 25 MG PO QPM


Mometasone Furoate-Formoterol (Dulera 100/5 Mcg), 2 PUFFS INH Q12


Quetiapine Fumarate (Seroquel), 25 MG PO HS


Venlafaxine Hcl (Effexor Xr), 262.5 MG PO QAM


Venlafaxine Hcl (Effexor Xr), 150 MG PO DAILY


Venlafaxine Hcl (Effexor Xr), 37.5 MG PO DAILY





Scheduled PRN


Acetaminophen/Codeine (Tylenol W/Codeine #3), 1 TAB PO BID PRN for Pain


Levalbuterol Tartrate (Levalbuterol Tartrate Hfa), 2 PUFFS INH Q4H PRN for PRN


Nitroglycerin (Nitrostat), 0.4 MG UT UD PRN for Chest Pain





Allergies


Coded Allergies:  


     Adhesives (Verified  Allergy, Intermediate, if on for a while-red blisters

, 11/19/17)


     Bacitracin (Verified  Allergy, Mild, ALLERGY-IRRITATION, 11/19/17)


     Neomycin (Verified  Allergy, Mild, ALLERGY, 11/19/17)


     Polymyxin B (Verified  Allergy, Mild, ALLERGY, 11/19/17)





Physical Exam


Vital Signs











  Date Time  Temp Pulse Resp B/P (MAP) Pulse Ox O2 Delivery O2 Flow Rate FiO2


 


3/19/18 01:16  82 16 173/70 100 Nasal Cannula  


 


3/19/18 01:11  80 15 175/86    


 


3/19/18 01:10 36.8 84 18  98 Room Air  


 


3/19/18 01:06  72 16 175/64  Nasal Cannula  


 


3/19/18 01:01  79 18 144/83 97   


 


3/19/18 00:56  76 24 148/73 96 Nasal Cannula  


 


3/19/18 00:51  78 13 141/61 84 Nasal Cannula  


 


3/19/18 00:49    155/86    


 


3/19/18 00:47    100/60    


 


3/19/18 00:46  90 29  91   


 


3/19/18 00:41  80 19 149/72 82 Nasal Cannula 2.0 


 


3/19/18 00:37    169/91    


 


3/19/18 00:37        


 


3/19/18 00:36  73 19  97   


 


3/19/18 00:30  80      


 


3/18/18 23:42  63      


 


3/18/18 23:36  63 18 152/66 100 Room Air  


 


3/18/18 22:08  82 18 137/57 98 Nasal Cannula 2.0 


 


3/18/18 19:53 36.8 78 18 161/118 98 Room Air  











Physical Exam


General: Well developed, well nourished older female in no acute distress, 

breathing comfortably on room air. Normal speech. Glascow coma score of 15. 

Wearing a sling.


HEENT: Normal cephalic atraumatic.  Pupils are equal round and reactive to 

light.  Extraocular movements are intact.  Oropharynx is pink with moist mucous 

membranes.  No swelling of the mouth lips or tongue. No hyphema. No blood from 

the nose or septal hematoma. Mid face is stable. No dental trauma or 

malocclusion.


Neck: Collared with a midline trachea.  No meningeal signs or stiffness. No 

midline tenderness.  No Stridor.


Chest: Clear to auscultation bilaterally.  No wheezes or rhonchi.  No increased 

work of breathing. No rib or sternal tenderness. No subcutaneous air. No seat 

belt marks or external signs of trauma.


Heart: Regular rate and rhythm without murmurs or gallops.


Abdomen: Soft nontender, nondistended without rebound guarding or rigidity.  No 

seatbelt marks or external signs of trauma


Extremities: No cyanosis clubbing or edema. No calf tenderness or asymmetry. 

Complain of right shoulder pain. Abrasion to right elbow.


Spine/Back. Non tender to palpation. No CVA tenderness.


Skin: Good turgor without rashes.


Neurologic exam: Cranial nerves two through 12 are intact.  Motor and sensation 

are intact and symmetrical throughout.  Normal level of consciousness. 

Complains of weakness in right 2nd, 4th, 5th fingers with squeezing. she has 

normal sensation in the axillary artery distribution.





Medical Decision & Procedures


ER Provider


Diagnostic Interpretation:


Radiology results as stated below per my review and radiologist interpretation:





R SHOULDER MIN 2 VIEWS ROUTINE





HISTORY:  86 years-old Female eval for trauma acute right shoulder pain status


post trauma





COMPARISON: Chest radiograph 11/20/2017





TECHNIQUE: 2 views of the right shoulder





FINDINGS: 


There is an acute anteroinferior dislocation of the humeral head in relation to


the glenoid fossa which is positioned within a subcoracoid location. There is a


3.3 x 1.6 cm bone fragment overlying the glenoid fossa without definite bony


Bankart injury identified. Moderate degenerative changes of the AC joint. Bones


appear mildly demineralized.





IMPRESSION: Acute dislocation of the humeral head in relation to the glenoid


fossa which is positioned within a subcoracoid location. 3.3 cm bone fragment


overlying the glenoid fossa suggests fractured greater tuberosity fragment. 











The above report was generated using voice recognition software. It may contain


grammatical, syntax or spelling errors.











Electronically signed by:  Agustín Church M.D.


3/18/2018 9:35 PM





Dictated Date/Time:  3/18/2018 9:33 PM





Laboratory Results


3/19/18 01:54

















Test


  3/19/18


01:54 3/19/18


02:28


 


Prothrombin Time


  11.0 SECONDS


(9.0-12.0) 


 


 


Prothromb Time International


Ratio 1.0 (0.9-1.1) 


  


 


 


Activated Partial


Thromboplast Time 20.1 SECONDS


(21.0-31.0) 


 


 


Partial Thromboplastin Ratio 0.8  


 


Anion Gap


  12.0 mmol/L


(3-11) 


 


 


Est Creatinine Clear Calc


Drug Dose 30.9 ml/min 


  


 


 


Estimated GFR (


American) 54.4 


  


 


 


Estimated GFR (Non-


American 47.0 


  


 


 


BUN/Creatinine Ratio 13.5 (10-20)  


 


Calcium Level


  8.4 mg/dl


(8.5-10.1) 


 


 


Magnesium Level


  2.0 mg/dl


(1.8-2.4) 


 


 


Total Bilirubin


  0.5 mg/dl


(0.2-1) 


 


 


Direct Bilirubin


  0.1 mg/dl


(0-0.2) 


 


 


Aspartate Amino Transf


(AST/SGOT) 26 U/L (15-37) 


  


 


 


Alanine Aminotransferase


(ALT/SGPT) 29 U/L (12-78) 


  


 


 


Alkaline Phosphatase


  84 U/L


() 


 


 


Total Protein


  6.6 gm/dl


(6.4-8.2) 


 


 


Albumin


  3.7 gm/dl


(3.4-5.0) 


 


 


Thyroid Stimulating Hormone


(TSH) 3.980 uIu/ml


(0.300-4.500) 


 





Laboratory studies as stated above per my review.





Medications Administered











 Medications


  (Trade)  Dose


 Ordered  Sig/Debi


 Route  Start Time


 Stop Time Status Last Admin


Dose Admin


 


 Ondansetron HCl


  (Zofran Inj)  4 mg  NOW  STAT


 IV  3/18/18 20:09


 3/18/18 20:11 DC 3/18/18 20:30


4 MG


 


 Morphine Sulfate


  (MoRPHine


 SULFATE INJ)  2 mg  NOW  STAT


 IV  3/18/18 20:09


 3/18/18 20:11 DC 3/18/18 20:30


2 MG


 


 Diphenhydramine


 HCl


  (Benadryl Inj)  12.5 mg  NOW  STAT


 IV  3/18/18 20:47


 3/18/18 20:49 DC 3/18/18 20:54


12.5 MG


 


 Fentanyl Citrate


  (Fentanyl Inj)  50 mcg  NOW  STAT


 IV  3/18/18 21:48


 3/18/18 21:49 DC 3/18/18 21:48


25 MCG


 


 Fentanyl Citrate


  (Fentanyl Inj)  25 mcg  NOW  STAT


 IV  3/19/18 01:29


 3/19/18 01:31 DC 3/19/18 02:01


25 MCG











Procedure


Procedural Sedation





Indication right shoulder dislocation.





Total time: 15 minutes.





Written consent was obtained after the risks and benefits were explained to the 

patient, including, but not limited to aspiration, allergic reaction, breathing 

difficulties, cardiac complications, vomiting, pain, event recall, bleeding, and

/or infection.  Pre-sedation examination and paperwork completed.  The patient 

was on 100% oxygen via NRB prior to the procedure. Continous end tidal CO2 

monitoring, pulse oximetry, and cardiac monitoring were utilized. Suction, 

airway equipment, medications, respiratory equipment, and appropriate personnel 

were prepared prior to the initiation of the procedure. A time out was taken.  

Sedation was achieved utilizing a total of 70 mg of propofol in increments of 

20 and 10 IV.  After I observed the patient had reached the appropriate level 

of sedation the main procedure was performed without complication.  Sedation 

was discontinued and the monitoring continued.  The patient recovered quickly 

from the effects of the medication without complication or adverse event.








Right anterior shoulder reduction:


A gentle attempt was attempted with the Muñoz method and was unsuccessful as the 

patient would not relax in light of this we did use sedation.  Clinton Leonard PA-C 

performed the procedure under my supervision as I sedated the patient and 

monitored the airway and sedation.  Gentle traction/countertraction was 

applied.  There was movement felt and she felt better although still had pain.  

We got a postreduction film and the shoulder was still dislocated.  The bony 

fragment that was there was even lateral as well.  Further raising the concern 

that this could actually be from the glenoid fossa.  This is likely the reason 

why the shoulder cannot be reduced.  Following reduction attempt, the patient 

had improved feeling in her right hand and had no numbness or weakness.  She 

also had no numbness weakness or axillary artery.  With the exception of 

inability to reduce, the patient had no difficulties or complications.





ED Course


1955: Past medical records reviewed. The patient was evaluated in room B4B, and 

a complete history and physical examination were performed.





2009: Ordered Morphine Sulfate 2 mg IV and Zofran 4 mg IV





2047: Ordered Benadryl 12.5 mg IV





2105: I reassessed the patient at this time. There is mild redness above her IV 

site. She was given Benadryl. 





2144: I reassessed the patient at this time. The redness at her IV site has 

resolved.





2148: Ordered Fentanyl 50 mcg IV 





2149: Ordered Fentanyl 25 mcg IV





2218: I reassessed the patient at this time. She is still in pain. 





2245: I reassessed the patient at this time. Attempted to relocate the patient'

s right shoulder, though the patient is in significant pain and unable to fully 

cooperate.





2330: The patient was moved to room B1 for conscious sedation.





2347: Ordered Propofol 70 mg IV





0024: I reassessed the patient. The patient's IV has not been established. 





0030: I reassessed the patient. The patient's IV has been established.





0033: I reassessed the patient. I attempted a reduction of the right shoulder, 

though was unsuccessful. 





0103: I spoke with Dr. Irby, orthopedic surgery. We discussed the patient's 

case. The patient will be further evaluated.





0114: I spoke with Dr. Irby, orthopedic surgery. He recommends the patient 

be further evaluated by the medical team. He recommends a CT scan. 





0118: I spoke with Dr. Santana, [facility]. We discussed the patient'

s case. [facility] 

[facility] management.





0129: Ordered Fentanyl 25 mcg IV





Medical Decision


Differentials include, but are not limited to: shoulder fracture, dislocation, 

musculoskeletal, and trauma.





This patient comes in as described above she suffered a mechanical fall when 

trying to help a friend.  She does have a history of CLL among other medical 

problems but is feeling well prior.  She has deformed right shoulder x-rays 

were obtained and shows anterior dislocation.  There is a fracture fragment 

which was thought to be off the humeral head initially.  My concern was that 

she had some tingling in her third fourth and fifth fingers on that side 

initially although this improved with attempted reduction.  We initially tried 

to gently use the Hills maneuver and then gentle traction using the Muñoz 

technique however the patient had too much resistance and pain even with 

several dosages of IV fentanyl.  She then had conscious sedation as outlined 

above.  She was n.p.o. for greater than 8 hours.  She tolerated the sedation 

well however the shoulder cannot be reduced easily.  On follow-up films, the 

fragment is there and more lateral raising the concern that it could be related 

to the glenoid fossa even I discussed case with Dr. Shaikh. he recommends we 

admit her to medicine and get a shoulder CT and they will see her and keep her 

n.p.o. after midnight.  They will likely take her to the operating room for 

reduction and possible operative repair as it is likely that the bony fragments 

and damages blocking the reduction.  The patient was happy with the plan and 

Dr. Smyth saw in the ER as well.





Medication Reconcilliation


Current Medication List:  was personally reviewed by me





Blood Pressure Screening


Patient's blood pressure:  Elevated blood pressure


Blood pressure disposition:  Elevated BP felt to be situational





Consults


Time Called:  0100


Consulting Physician:  Dr. Irby, orthopedic surgery


Returned Call:  0103


 I spoke with Dr. Irby, orthopedic surgery. We discussed the patient's 

case. The patient will be further evaluated.





0114: I spoke with Dr. Irby, orthopedic surgery. He recommends the patient 

be further evaluated by the medical team. He recommends a CT scan.


Additional Consults:  


   Time Called:  0116


   Consulted Physician:  Dr. Santana [facility]


   Returned Call:  0118


Additional Comments:


I spoke with Dr. Santana [facility]. We discussed the patient's 

case. [facility] 

further management.





Impression





 Primary Impression:  


 Closed fracture dislocation of right shoulder





Scribe Attestation


The scribe's documentation has been prepared under my direction and personally 

reviewed by me in its entirety. I confirm that the note above accurately 

reflects all work, treatment, procedures, and medical decision making performed 

by me.





Departure Information


Dispostion


Being Evaluated By Hospitalist





Referrals


Germán Do DMickOMick (PCP)





Patient Instructions


My [facility]

## 2018-03-18 NOTE — DIAGNOSTIC IMAGING REPORT
R SHOULDER MIN 2 VIEWS ROUTINE



HISTORY:  86 years-old Female eval for trauma acute right shoulder pain status

post trauma



COMPARISON: Chest radiograph 11/20/2017



TECHNIQUE: 2 views of the right shoulder



FINDINGS: 

There is an acute anteroinferior dislocation of the humeral head in relation to

the glenoid fossa which is positioned within a subcoracoid location. There is a

3.3 x 1.6 cm bone fragment overlying the glenoid fossa without definite bony

Bankart injury identified. Moderate degenerative changes of the AC joint. Bones

appear mildly demineralized.



IMPRESSION: Acute dislocation of the humeral head in relation to the glenoid

fossa which is positioned within a subcoracoid location. 3.3 cm bone fragment

overlying the glenoid fossa suggests fractured greater tuberosity fragment. 







The above report was generated using voice recognition software. It may contain

grammatical, syntax or spelling errors.







Electronically signed by:  Agustín Church M.D.

3/18/2018 9:35 PM



Dictated Date/Time:  3/18/2018 9:33 PM

## 2018-03-19 VITALS
TEMPERATURE: 98.06 F | HEART RATE: 80 BPM | DIASTOLIC BLOOD PRESSURE: 85 MMHG | OXYGEN SATURATION: 98 % | SYSTOLIC BLOOD PRESSURE: 130 MMHG

## 2018-03-19 VITALS
TEMPERATURE: 98.42 F | DIASTOLIC BLOOD PRESSURE: 54 MMHG | HEART RATE: 80 BPM | SYSTOLIC BLOOD PRESSURE: 90 MMHG | OXYGEN SATURATION: 93 %

## 2018-03-19 VITALS
TEMPERATURE: 98.24 F | HEART RATE: 86 BPM | DIASTOLIC BLOOD PRESSURE: 66 MMHG | OXYGEN SATURATION: 97 % | SYSTOLIC BLOOD PRESSURE: 144 MMHG

## 2018-03-19 VITALS
HEART RATE: 79 BPM | TEMPERATURE: 97.7 F | DIASTOLIC BLOOD PRESSURE: 67 MMHG | SYSTOLIC BLOOD PRESSURE: 111 MMHG | OXYGEN SATURATION: 98 %

## 2018-03-19 VITALS
HEART RATE: 83 BPM | SYSTOLIC BLOOD PRESSURE: 90 MMHG | OXYGEN SATURATION: 93 % | TEMPERATURE: 97.88 F | DIASTOLIC BLOOD PRESSURE: 53 MMHG

## 2018-03-19 VITALS — DIASTOLIC BLOOD PRESSURE: 62 MMHG | HEART RATE: 81 BPM | SYSTOLIC BLOOD PRESSURE: 101 MMHG

## 2018-03-19 VITALS
SYSTOLIC BLOOD PRESSURE: 115 MMHG | OXYGEN SATURATION: 96 % | DIASTOLIC BLOOD PRESSURE: 50 MMHG | TEMPERATURE: 97.88 F | HEART RATE: 79 BPM

## 2018-03-19 VITALS — HEART RATE: 84 BPM | OXYGEN SATURATION: 98 % | TEMPERATURE: 98.24 F

## 2018-03-19 VITALS
TEMPERATURE: 97.88 F | OXYGEN SATURATION: 92 % | DIASTOLIC BLOOD PRESSURE: 55 MMHG | HEART RATE: 95 BPM | SYSTOLIC BLOOD PRESSURE: 102 MMHG

## 2018-03-19 VITALS — OXYGEN SATURATION: 96 %

## 2018-03-19 VITALS — OXYGEN SATURATION: 97 %

## 2018-03-19 VITALS — OXYGEN SATURATION: 98 %

## 2018-03-19 LAB
ALBUMIN SERPL-MCNC: 3.7 GM/DL (ref 3.4–5)
ALP SERPL-CCNC: 84 U/L (ref 45–117)
ALT SERPL-CCNC: 29 U/L (ref 12–78)
AST SERPL-CCNC: 26 U/L (ref 15–37)
BASOPHILS # BLD: 0.07 K/UL (ref 0–0.2)
BASOPHILS NFR BLD: 0.2 %
BUN SERPL-MCNC: 14 MG/DL (ref 7–18)
CALCIUM SERPL-MCNC: 8.4 MG/DL (ref 8.5–10.1)
CO2 SERPL-SCNC: 20 MMOL/L (ref 21–32)
CREAT SERPL-MCNC: 1.07 MG/DL (ref 0.6–1.2)
EOS ABS #: 0.12 K/UL (ref 0–0.5)
EOSINOPHIL NFR BLD AUTO: 170 K/UL (ref 130–400)
GLUCOSE SERPL-MCNC: 81 MG/DL (ref 70–99)
HCT VFR BLD CALC: 32.6 % (ref 37–47)
HGB BLD-MCNC: 10.4 G/DL (ref 12–16)
IG#: 0.22 K/UL (ref 0–0.02)
IMM GRANULOCYTES NFR BLD AUTO: 76 %
INR PPP: 1 (ref 0.9–1.1)
LYMPHOCYTES # BLD: 30.86 K/UL (ref 1.2–3.4)
MCH RBC QN AUTO: 29.5 PG (ref 25–34)
MCHC RBC AUTO-ENTMCNC: 31.9 G/DL (ref 32–36)
MCV RBC AUTO: 92.4 FL (ref 80–100)
MONO ABS #: 1.33 K/UL (ref 0.11–0.59)
MONOCYTES NFR BLD: 3.3 %
NEUT ABS #: 8 K/UL (ref 1.4–6.5)
NEUTROPHILS # BLD AUTO: 0.3 %
NEUTROPHILS NFR BLD AUTO: 19.7 %
PMV BLD AUTO: 9.2 FL (ref 7.4–10.4)
POTASSIUM SERPL-SCNC: 4.2 MMOL/L (ref 3.5–5.1)
PROT SERPL-MCNC: 6.6 GM/DL (ref 6.4–8.2)
PTT PATIENT: 20.1 SECONDS (ref 21–31)
RED CELL DISTRIBUTION WIDTH CV: 13.9 % (ref 11.5–14.5)
RED CELL DISTRIBUTION WIDTH SD: 46.7 FL (ref 36.4–46.3)
SODIUM SERPL-SCNC: 140 MMOL/L (ref 136–145)
WBC # BLD AUTO: 40.6 K/UL (ref 4.8–10.8)

## 2018-03-19 PROCEDURE — 0PSCXZZ REPOSITION RIGHT HUMERAL HEAD, EXTERNAL APPROACH: ICD-10-PCS

## 2018-03-19 PROCEDURE — 0RSJXZZ REPOSITION RIGHT SHOULDER JOINT, EXTERNAL APPROACH: ICD-10-PCS

## 2018-03-19 PROCEDURE — 0RSJXZZ REPOSITION RIGHT SHOULDER JOINT, EXTERNAL APPROACH: ICD-10-PCS | Performed by: EMERGENCY MEDICINE

## 2018-03-19 RX ADMIN — OXYCODONE HYDROCHLORIDE AND ACETAMINOPHEN PRN TAB: 5; 325 TABLET ORAL at 18:04

## 2018-03-19 RX ADMIN — FLUTICASONE PROPIONATE SCH SPRAYS: 50 SPRAY, METERED NASAL at 18:03

## 2018-03-19 RX ADMIN — LACTOBACILLUS TAB SCH TAB: TAB at 21:23

## 2018-03-19 RX ADMIN — VENLAFAXINE HYDROCHLORIDE SCH MG: 150 CAPSULE, EXTENDED RELEASE ORAL at 08:31

## 2018-03-19 RX ADMIN — GABAPENTIN SCH MG: 300 CAPSULE ORAL at 21:23

## 2018-03-19 RX ADMIN — FERROUS SULFATE TAB EC 325 MG (65 MG FE EQUIVALENT) SCH MG: 325 (65 FE) TABLET DELAYED RESPONSE at 21:23

## 2018-03-19 RX ADMIN — OXYCODONE HYDROCHLORIDE AND ACETAMINOPHEN PRN TAB: 5; 325 TABLET ORAL at 07:15

## 2018-03-19 RX ADMIN — LEVOTHYROXINE SODIUM SCH MCG: 75 TABLET ORAL at 06:11

## 2018-03-19 RX ADMIN — ALUMINUM ZIRCONIUM TRICHLOROHYDREX GLY SCH EA: 0.2 STICK TOPICAL at 08:00

## 2018-03-19 RX ADMIN — VENLAFAXINE HYDROCHLORIDE SCH MG: 37.5 CAPSULE, EXTENDED RELEASE ORAL at 08:31

## 2018-03-19 RX ADMIN — FERROUS SULFATE TAB EC 325 MG (65 MG FE EQUIVALENT) SCH MG: 325 (65 FE) TABLET DELAYED RESPONSE at 08:30

## 2018-03-19 RX ADMIN — QUETIAPINE FUMARATE SCH MG: 25 TABLET, FILM COATED ORAL at 21:23

## 2018-03-19 RX ADMIN — ALUMINUM ZIRCONIUM TRICHLOROHYDREX GLY SCH EA: 0.2 STICK TOPICAL at 16:00

## 2018-03-19 RX ADMIN — LORAZEPAM SCH MG: 0.5 TABLET ORAL at 21:00

## 2018-03-19 RX ADMIN — METOPROLOL SUCCINATE SCH MG: 25 TABLET, EXTENDED RELEASE ORAL at 21:00

## 2018-03-19 RX ADMIN — BIMATOPROST SCH DROPS: 0.1 SOLUTION/ DROPS OPHTHALMIC at 21:23

## 2018-03-19 RX ADMIN — LISINOPRIL SCH MG: 10 TABLET ORAL at 21:00

## 2018-03-19 RX ADMIN — VENLAFAXINE HYDROCHLORIDE SCH MG: 75 CAPSULE, EXTENDED RELEASE ORAL at 08:31

## 2018-03-19 RX ADMIN — ATORVASTATIN CALCIUM SCH MG: 40 TABLET, FILM COATED ORAL at 21:23

## 2018-03-19 NOTE — HISTORY & PHYSICAL EXAMINATION
DATE OF ADMISSION:  03/19/2018

 

PRIMARY CARE DOCTOR:  Dr. Do.

 

CHIEF COMPLAINT:  Right shoulder pain, fall.

 

HISTORY OF PRESENT ILLNESS:  



History obtained from the patient and records.  



Medical history significant for hypertension,

hyperlipidemia, chronic lymphocytic leukemia, asthma, CAD, PVD as per

records, hypothyroidism.  Chronic anemia (baseline hemoglobin of

10 to 11)



Recent confinement last November 2017 for UTI.



Patient was helping out a neighbor who had a fall tonight. 

Running up the steps, she fell down on her right shoulder, excruciating pain.

No chest pain.  No shortness of breath.  No syncope.  No head trauma. 



At the Emergency Room, attempted closed reduction done under conscious sedation

was unsuccessful.



Medical history as above :



Recent history of Lyme disease sp treatment.  

 

SURGERIES:  Tonsillectomy, adenoidectomy, and breast reduction.

 

HOME MEDICATIONS:  Include aspirin, Lipitor, Astelin, Tylenol, Lumigan,

calcium plus D, vitamin D3, Premarin, ferrous sulfate, Flonase, Neurontin,

Culturelle, levothyroxine, Zestril, Ativan, Lamictal, albuterol, Toprol-XL,

Dulera, Nitrostat, and Effexor.

 

ALLERGIES:  TO ADHESIVE, BACITRACIN, NEOMYCIN, AND POLYMYXIN.

 

FAMILY HISTORY:  Heart disease.

 

PERSONAL AND SOCIAL HISTORY:  Nonsmoker.  No chronic ETOH intake.  Retired 
nurse. 



REVIEW OF SYSTEMS:  As per HPI, all 10 systems reviewed, all other ROS

negative.

 

PHYSICAL EXAMINATION:

VITAL SIGNS:  Blood pressure was noted to be 170/70, pulse rate 82, RR 18 T 37,

sats 98 on room air.

GENERAL:  Noted to be slightly uncomfortable, anxious, no respiratory

distress.

SKIN:  Pallor, warm.

HEENT:  Pale palpebral conjunctivae.  No ptosis.  Dry mucosa.

NECK:  Supple, nontender.

CHEST:  Poor effort.  No tenderness.

HEART:  Regular rate and rhythm, systolic murmur.

ABDOMEN:  Some distention, nontender.

EXTREMITIES:  Sling on the right upper extremity with tenderness, ecchymosis

to the right upper extremity.

NEUROLOGIC:  Coherent.  No gross focality.

 

LABORATORY DATA:  Hemoglobin was noted to be 10.4, hematocrit 32.6, white

blood cells 40, and platelets noted to be 170.  Sodium noted to be 140,

potassium 4.2, chloride 108, CO2 of 20, BUN 14, creatinine 1, glucose was

noted to be 81.  



Shoulder CT initial read, anterior glenohumeral joint

dislocation with humeral head and neck in subcoracoid position associated

with fracture through the greater tuberosity of the humerus, greater

tuberosity fragment measuring 2.2 cm remained located in the glenoid fossa,

4.2 cm of dislocated distal humerus, nondisplaced fracture through anterior

glenoid rim, hemarthrosis, and soft tissue swelling.

 

ASSESSMENT:  



1. Traumatic right shoulder dislocation status post incomplete/unsuccessful 
reduction at the ER

2. Traumatic hemarthrosis 2 to fall injury 

hemoglobin stable

3.  chronic lymphocytic leukemia, in remission as per records. 

4.  HTN urgency

secondary to pain, missed PM meds

5.  coronary artery disease, peripheral vascular disease as per records

6.  chronic anemia, hemoglobin at baseline,

7.  asthma stable

8.  hx Lyme dse status post recent treatment.

 

PLAN:  



F

analgesia

Orthopedics consult RE traumatic right shoulder dislocation

(ER provider already in touch with Dr. Irby.)

Trend HH RE hemarthrosis, hold home ASA until hemoglobin stable 

Facilitate nighttime BP meds 

DVT prophylaxis, SCDs  RE hemarthrosis

DNR.  



Patient's  requesting for updates from providers, 

 Lisandro Mullerener thru contact number [phone].

 

 

 

TAISHA

## 2018-03-19 NOTE — DIAGNOSTIC IMAGING REPORT
RIGHT SHOULDER CT



CT DOSE: 467.62 mGy.cm



HISTORY: Fall. Right shoulder pain.  eval for fracture



TECHNIQUE: Multiaxial CT images of the right shoulder were performed and

reformatted in the sagittal and coronal plane without the use of contrast.  A

dose lowering technique was utilized adhering to the principles of ALARA.



COMPARISON:  Right shoulder 3/19/2018.



FINDINGS: Anterior dislocation of the right humeral head in relation to the

glenoid. There is a small slightly distracted Bankart fracture of the

anterior-inferior glenoid. Slightly comminuted and displaced fracture at the

greater tuberosity of the humeral head this demonstrates up to 3.6 cm of lateral

displacement and is located within the glenoid fossa. Hemarthrosis. Small amount

of hemorrhage within the infraclavicular and right axillary locations. There is

also a small amount of right supraclavicular soft tissue hemorrhage.



IMPRESSION:  



1. Right anterior shoulder dislocation with a displaced fracture of the greater

tuberosity of the humeral head and a slightly distracted Bankart fracture of the

glenoid.

2. Hemarthrosis with surrounding soft tissue hematoma as described above







Electronically signed by:  Jean-Claude Archer M.D.

3/19/2018 7:19 AM



Dictated Date/Time:  3/19/2018 7:14 AM

## 2018-03-19 NOTE — OPERATIVE REPORT
DATE OF OPERATION:  03/19/2018

 

PREOPERATIVE DIAGNOSES:

1.  Right shoulder fracture dislocation.

2.  Fracture of the greater tuberosity of the humerus.

 

POSTOPERATIVE DIAGNOSES:  Same.

 

PROCEDURE:

1.  Right shoulder closed reduction with monitored anesthesia care sedation.

2.  Closed treatment greater tuberosity fracture of the humerus.

 

SURGEON:  Dr. Lowry.

 

FIRST ASSIST:  Abundio Silva PA-C who was present for patient positioning,

sterile prep and drape, management of retractors and instruments.

 

ANESTHESIA:  General MAC.

 

SPECIMENS:  None.

 

DRAINS:  None.

 

COMPLICATIONS:  None.

 

ESTIMATED BLOOD LOSS:  Zero.

 

PERTINENT HISTORY:  This is an 86-year-old female who was attempting to help

a friend who had had a fall.  She stumbled and fell on her right upper

extremity, producing a fracture dislocation of her right shoulder.  She was

seen in [facility] ER, they had attempted to reduce her

shoulder, unable to do so and she was then admitted to the hospital for

planned closed reduction with general anesthesia.  The patient had no loss of

consciousness or other associated injuries of substance.

 

We discussed with the patient the possibility of potential complications,

risks, benefits, rehab possibility of infection, bleeding, neurovascular

injury, wound complications, DVT, PE, death or skin breakdown or possibility

for need of open reduction of the shoulder.  The patient decided to proceed

with the procedure as indicated.

 

DESCRIPTION OF PROCEDURE:  The patient was taken to operative suite, placed

supine on the operating room table.  After review of the consent and

identification of proper operative site, patient anesthetized, and monitored

anesthesia care was provided with chin lift and oxygenation.  The counter

traction was applied to the right 4/4 and torso by the physician assistant

and then external rotation traction followed by internal rotation and

manipulation of the humeral head back into the glenoid was performed with a

gentle care and traction.  A palpable clunk was palpated when the shoulder

was then reduced into the glenohumeral joint.  Next, multiple C-arm

fluoroscopic views were obtained including AP, scapular Y, internal and

external rotation views confirming concentric reduction within the

glenohumeral joint.  The greater tuberosity fracture had also reduced to a

markedly improved location after reduction of the humerus and the glenoid. 

Next, a sling was applied with the arm held in a slight internal rotation. 

The patient was awakened and then taken to recovery in stable condition.

 

 

I attest to the content of the Intraoperative Record and any orders documented therein. Any exception
s are noted below.

## 2018-03-19 NOTE — DIAGNOSTIC IMAGING REPORT
FLUOROSCOPIC SPOT IMAGES OF THE RIGHT SHOULDER 2 VIEWS



CLINICAL HISTORY: Closed reduction of a right shoulder dislocation    



COMPARISON STUDY:  CT scan dated 3/19/2018



FINDINGS: 11 seconds of fluoroscopic time was utilized. 2 fluoroscopic spot

images are provided for interpretation. There has been interval reduction of

previously described dislocation. There is a mildly comminuted and distracted

fracture of the greater tuberosity of the humerus.



IMPRESSION:  

1. Interval reduction of the previously identified dislocation

2. Greater tuberosity fracture 









Electronically signed by:  Joshua Medina M.D.

3/19/2018 4:58 PM



Dictated Date/Time:  3/19/2018 4:57 PM

## 2018-03-19 NOTE — ANESTHESIOLOGY PROGRESS NOTE
Anesthesia Post Op Note


Date & Time


Mar 19, 2018 at 17:05





Vital Signs


Pain Intensity:  0





Vital Signs Past 12 Hours








  Date Time  Temp Pulse Resp B/P (MAP) Pulse Ox O2 Delivery O2 Flow Rate FiO2


 


3/19/18 16:55  78 16 114/48 98 Room Air  


 


3/19/18 16:47 36.5 80 16 97/49 100 Room Air  


 


3/19/18 11:44 36.9 80 18 90/54 (66) 93 Room Air  


 


3/19/18 08:14     97 Room Air  


 


3/19/18 08:12      Room Air  


 


3/19/18 08:04 36.8 86 20 144/66 (92) 97 Room Air  


 


3/19/18 06:00     98 Room Air 2.0 











Notes


Mental Status:  alert / awake / arousable, participated in evaluation


Pt Amnestic to Procedure:  Yes


Nausea / Vomiting:  adequately controlled


Pain:  adequately controlled


Airway Patency, RR, SpO2:  stable & adequate


BP & HR:  stable & adequate


Hydration State:  stable & adequate


Anesthetic Complications:  no major complications apparent


Awake, doing well, no complaints. VSS.

## 2018-03-19 NOTE — DIAGNOSTIC IMAGING REPORT
R SHOULDER 1 VIEW



CLINICAL HISTORY: 86 years-old Female presenting with post reduction. 



TECHNIQUE: Single supine frontal view of the right shoulder was obtained. 



COMPARISON: 3/18/2018.



FINDINGS:

The right humeral head remains anteriorly dislocated in the subcoracoid region,

unchanged from prior. The greater tuberosity fracture fragment remains displaced

from the humeral head by over 2 cm. Alignment has not been restored. No new

osseous abnormality.



IMPRESSION:

Persistent anterior dislocation of the right humeral head with significantly

displaced greater tuberosity fracture fragment.







Electronically signed by:  Johan Vaca M.D.

3/19/2018 7:10 AM



Dictated Date/Time:  3/19/2018 7:08 AM

## 2018-03-19 NOTE — EMERGENCY ROOM VISIT NOTE
Post-Moderate Sedation Plan


General


Date of Moderate Sedation


Mar 19, 2018.


Vital Signs:





Vital Signs Past 12 Hours








  Date Time  Temp Pulse Resp B/P (MAP) Pulse Ox O2 Delivery O2 Flow Rate FiO2


 


3/19/18 01:16  82 16 173/70 100 Nasal Cannula  


 


3/19/18 01:11  80 15 175/86    


 


3/19/18 01:10 36.8 84 18  98 Room Air  


 


3/19/18 01:06  72 16 175/64  Nasal Cannula  


 


3/19/18 01:01  79 18 144/83 97   


 


3/19/18 00:56  76 24 148/73 96 Nasal Cannula  


 


3/19/18 00:51  78 13 141/61 84 Nasal Cannula  


 


3/19/18 00:49    155/86    


 


3/19/18 00:47    100/60    


 


3/19/18 00:46  90 29  91   


 


3/19/18 00:41  80 19 149/72 82 Nasal Cannula 2.0 


 


3/19/18 00:37    169/91    


 


3/19/18 00:37        


 


3/19/18 00:36  73 19  97   


 


3/19/18 00:30  80      


 


3/18/18 23:42  63      


 


3/18/18 23:36  63 18 152/66 100 Room Air  


 


3/18/18 22:08  82 18 137/57 98 Nasal Cannula 2.0 


 


3/18/18 19:53 36.8 78 18 161/118 98 Room Air  











Review - Discharge Plan


Post Moderate Sedation Plan:





On clinical assessment, the patient appears to have tolerated the conscious 

sedation 


without complications.





Patient is recovering as anticipated.





Patient will continue to be monitored by nursing and may be discharged when 

conscious 


sedation discharge criteria are met.

## 2018-03-19 NOTE — [facility] POST OPERATIVE BRIEF NOTE
Immediate Operative Summary


Operative Date


Mar 19, 2018.





Pre-Operative Diagnosis





Right shoulder fracture dislocation; Fracture greater tuberosity





Post-Operative Diagnosis





Right shoulder fracture dislocation; Fracture greater tuberosity





Procedure(s) Performed





Closed reduction right shoulder dislocation with MAC anesthesia





Surgeon


B Alen ROTHMAN





Assistant Surgeon(s)


SHALONDA Silva PA-C





Estimated Blood Loss


0cc





Findings


Consistent with Post-Op Diagnosis





Specimens





None





Drains


None





Anesthesia Type


MAC





Complication(s)


none





Disposition


Accompanied Pt To Recover:  no


Disposition:  Recovery Room / PACU

## 2018-03-19 NOTE — PROGRESS NOTE
Medicine Progress Note


Date & Time of Visit:


Mar 19, 2018 at 10:23.


Subjective


Pt was seen and examined


Lying in bed with no distress


Pt said that right shoulder  tender with movement


She said that pain control 


Pt said that that she feels some tingling in the right fingers


Pt said that she was very active before the fall yesterday


Denies any chest pain, palpitation, dizziness and SOB





Objective





Last 8 Hrs








  Date Time  Temp Pulse Resp B/P (MAP) Pulse Ox O2 Delivery O2 Flow Rate FiO2


 


3/19/18 11:44 36.9 80 18 90/54 (66) 93 Room Air  








Physical Exam:


General- No acute distress


Head-  atraumatic


Eyes- PERRL, EOMI


ENT- oropharynx clear


Neck- supple, no JVD


Lungs- No wheezing


Heart- regular rhythm


Abdomen- normal bowel sounds, soft


Extremities- no calf tenderness, RUE with sling on


Neuro- alert, oriented x 3; PERRL, EOMI


Skin- warm & dry


Laboratory Results:





Last 24 Hours








Test


  3/19/18


01:54 3/19/18


03:11


 


Prothrombin Time 11.0 SECONDS  


 


Prothromb Time International


Ratio 1.0 


  


 


 


Activated Partial


Thromboplast Time 20.1 SECONDS 


  


 


 


Partial Thromboplastin Ratio 0.8  


 


Sodium Level 140 mmol/L  


 


Potassium Level 4.2 mmol/L  


 


Chloride Level 108 mmol/L  


 


Carbon Dioxide Level 20 mmol/L  


 


Anion Gap 12.0 mmol/L  


 


Blood Urea Nitrogen 14 mg/dl  


 


Creatinine 1.07 mg/dl  


 


Est Creatinine Clear Calc


Drug Dose 30.9 ml/min 


  


 


 


Estimated GFR (


American) 54.4 


  


 


 


Estimated GFR (Non-


American 47.0 


  


 


 


BUN/Creatinine Ratio 13.5  


 


Random Glucose 81 mg/dl  


 


Calcium Level 8.4 mg/dl  


 


Magnesium Level 2.0 mg/dl  


 


Total Bilirubin 0.5 mg/dl  


 


Direct Bilirubin 0.1 mg/dl  


 


Aspartate Amino Transf


(AST/SGOT) 26 U/L 


  


 


 


Alanine Aminotransferase


(ALT/SGPT) 29 U/L 


  


 


 


Alkaline Phosphatase 84 U/L  


 


Total Protein 6.6 gm/dl  


 


Albumin 3.7 gm/dl  


 


Thyroid Stimulating Hormone


(TSH) 3.980 uIu/ml 


  


 


 


White Blood Count  40.60 K/uL 


 


Red Blood Count  3.53 M/uL 


 


Hemoglobin  10.4 g/dL 


 


Hematocrit  32.6 % 


 


Mean Corpuscular Volume  92.4 fL 


 


Mean Corpuscular Hemoglobin  29.5 pg 


 


Mean Corpuscular Hemoglobin


Concent 


  31.9 g/dl 


 


 


Platelet Count  170 K/uL 


 


Mean Platelet Volume  9.2 fL 


 


Neutrophils (%) (Auto)  19.7 % 


 


Lymphocytes (%) (Auto)  76.0 % 


 


Monocytes (%) (Auto)  3.3 % 


 


Eosinophils (%) (Auto)  0.3 % 


 


Basophils (%) (Auto)  0.2 % 


 


Neutrophils # (Auto)  8.00 K/uL 


 


Lymphocytes # (Auto)  30.86 K/uL 


 


Monocytes # (Auto)  1.33 K/uL 


 


Eosinophils # (Auto)  0.12 K/uL 


 


Basophils # (Auto)  0.07 K/uL 


 


RDW Standard Deviation  46.7 fL 


 


RDW Coefficient of Variation  13.9 % 


 


Immature Granulocyte % (Auto)  0.5 % 


 


Immature Granulocyte # (Auto)  0.22 K/uL 


 


Smudge Cells  PRESENT 


 


Red Blood Cell Morphology  Unremarkable 











Assessment & Plan


Right shoulder pain


CT showed right anterior dislocation of the shoulder with greater tuberosity 

fracture and a Bankart fracture of the glenoid 


Case discussed with ortho team and plan to have a very minimum closed reduction 

of the anterior dislocation of the shoulder versus open reduction.  


Continue pain control


Pt has a functional capacity with a Met greater than 4 before the incident


Denies any chest pain, palpitation and SOB


Pt is stable to proceed with the minimum closed reduction for the right 

shoulder dislocation


Keep NPO for the procedure


Might need rehab for  PT/OT 








Traumatic hemarthrosis


Secondary to fall injury of the right shoulder


Continue monitor Hemoglobin





CLL


WBC 40K


Stable





 


HTN 


BP was very high on admission 


Mostly situational 


BP stable 








CAD


Denies any symptoms


Aspirin on hold due to surgery


Will continue Statin and metoprolol





Chronic anemia


Hemoglobin stable





Asthma 


Stable





DVT px


SCDs due to surgery





CODE STATUS FULL CODE


Consultants:


Ortho


Current Inpatient Medications:





Current Inpatient Medications








 Medications


  (Trade)  Dose


 Ordered  Sig/Debi


 Route  Start Time


 Stop Time Status Last Admin


Dose Admin


 


 Acetaminophen


  (Tylenol Tab)  650 mg  Q4H  PRN


 PO  3/19/18 02:45


 4/18/18 02:44   


 


 


 Atorvastatin


 Calcium


  (Lipitor Tab)  40 mg  QPM


 PO  3/19/18 21:00


 4/18/18 20:59   


 


 


 Ferrous Sulfate


  (Feosol Tab)  325 mg  BID


 PO  3/19/18 09:00


 4/18/18 08:59   


 


 


 Fluticasone


 Propionate


  (Flonase Nasal


 Spray)  2 sprays  QD@16


 JULIO  3/19/18 16:00


 4/18/18 15:59   


 


 


 Gabapentin


  (Neurontin Cap)  300 mg  HS


 PO  3/19/18 21:00


 4/18/18 20:59   


 


 


 Lamotrigine


  (Lamictal Tab)  100 mg  BID


 PO  3/19/18 09:00


 4/18/18 08:59   


 


 


 Levothyroxine


 Sodium


  (Synthroid Tab)  75 mcg  DAILYBB


 PO  3/19/18 06:00


 4/18/18 06:59  3/19/18 06:11


75 MCG


 


 Lisinopril


  (Zestril Tab)  10 mg  QPM


 PO  3/19/18 21:00


 4/18/18 20:59   


 


 


 Lorazepam


  (Ativan Tab)  0.25 mg  HS


 PO  3/19/18 21:00


 4/18/18 20:59   


 


 


 Metoprolol


 Succinate


  (Toprol Xl Tab)  25 mg  QPM


 PO  3/19/18 21:00


 4/18/18 20:59   


 


 


 Quetiapine


 Fumarate


  (seroQUEL TAB)  25 mg  HS


 PO  3/19/18 21:00


 4/18/18 20:59   


 


 


 Venlafaxine HCl


  (effeXOR


 EXTENDED REL CAP)  37.5 mg  DAILY


 PO  3/19/18 09:00


 4/18/18 08:59   


 


 


 Venlafaxine HCl


  (effeXOR


 EXTENDED REL CAP)  75 mg  QAM


 PO  3/19/18 09:00


 4/18/18 08:59   


 


 


 Venlafaxine HCl


  (effeXOR


 EXTENDED REL CAP)  150 mg  DAILY


 PO  3/19/18 09:00


 4/18/18 08:59   


 


 


 Miscellaneous


 Information


  (Order Awaiting


 Action)  1 ea  QS


 N/A  3/19/18 08:00


 4/18/18 07:59   


 


 


 Bimatoprost


  (Lumigan 0.01%)  1 drops  HS


 OPB  3/19/18 21:00


 4/18/18 20:59   


 


 


 Lactobacillus


 Acidophilus


  (Floranex Tab)  1 tab  QPM


 PO  3/19/18 21:00


 4/18/18 20:59   


 


 


 Miscellaneous


 Information


  (Order Awaiting


 Action)  1 ea  QS


 N/A  3/19/18 08:00


 4/18/18 07:59   


 


 


 Prochlorperazine


 Edisylate 5 mg/


 Syringe  5 ml @ 5


 mls/min  Q6H  PRN


 IV  3/19/18 02:45


 4/18/18 02:44   


 


 


 Tramadol HCl


  (Ultram Tab)  not


 relieved


 by tylenol @   Q6H  PRN


 PO  3/19/18 02:45


 4/18/18 02:44   


 


 


 Dextrose/Sodium


 Chloride  1,000 ml @ 


 50 mls/hr  Q20H


 IV  3/19/18 05:00


 4/18/18 04:59  3/19/18 05:34


50 MLS/HR


 


 Hydromorphone HCl


  (Dilaudid Inj)  1 mg  Q3H  PRN


 IV  3/19/18 06:00


 4/2/18 05:59  3/19/18 12:45


1 MG


 


 Oxycodone/


 Acetaminophen


  (Percocet


 5-325mg Tab)  1 tab  Q6H  PRN


 PO  3/19/18 06:00


 4/2/18 05:59  3/19/18 07:15


1 TAB

## 2018-03-19 NOTE — HISTORY & PHYSICAL BRIDGE NOTE
H&P Re-Evaluation


Bridge Note:


I have examined the patient, reviewed the History & Physical and in the 

interval since the performance of the History & Physical I have noted the 

following changes of clinical significance: Closed reduction right shoulder 

dislocation today.

## 2018-03-19 NOTE — CONSULTATION REPORT
DATE OF CONSULTATION:  03/19/2018

 

REASON FOR CONSULT:  Dislocated right shoulder.

 

HISTORY OF PRESENT ILLNESS:  The patient is an 86-year-old white female who

states that earlier in the day yesterday her neighbor's son was outside and

she was talking to him.  He ended up telling her that his mother had fallen

and was not sure if she had broken her hip or not.  She went over to see if

she could assist and in the process of helping them out, she ended up losing

her balance and falling onto her right shoulder.  She had immediate pain in

her right shoulder and had limited mobility.  The patient also states that

she has noticed that she has been having increased balance disturbances over

the last couple of months.  She denies any loss of consciousness, any

lightheadedness, dizziness, shortness of breath or chest pain prior to the

fall or after the fall.  She had great difficulty trying to get up on her own

and eventually an ambulance was called and she was brought to the Emergency

Room.  She was seen by the staff.  X-rays were taken and it was found that

she had a dislocated right shoulder.  Attempts were made to do a closed

reduction in the Emergency Room by Dr. Jacobson which did not happen.  The

attempts failed and she was then admitted for further care.  Dr. Irby was

called and ordered a CT scan which found continued anterior shoulder

dislocation with a displaced fracture of the greater tuberosity and a

slightly distracted Bankart fracture of the glenoid.  She currently states

that most of the pain is in her shoulder and radiates down the arm somewhat

and also across her shoulder to her neck.  She denies any overt neck pain at

this time.  We have been asked to see her for this dislocation.

 

PAST MEDICAL HISTORY:  Hypertension, hyperlipidemia, CLL, asthma, CAD, PVD,

hypothyroidism.

 

PAST SURGICAL HISTORY:  Tonsils and adenoids removed and breast reduction in

the past.

 

FAMILY HISTORY:  Heart disease.

 

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

retired nurse.

 

MEDICATIONS: Tylenol with codeine No. 3 1 tab p.o. b.i.d. p.r.n., aspirin 81

mg p.o. q.p.m., Lipitor 40 mg p.o. q.p.m., Astelin nasal spray 1 spray

nasally b.i.d., Lumigan 1 drop OPB at bedtime, calcium 600 plus D 1 tab p.o.

q.p.m., vitamin D3 two tabs p.o. daily, Premarin 1 application PV 2-3 times a

week, ferrous sulfate 325 mg p.o. b.i.d., Flonase 2 sprays nasally daily in

the afternoon, gabapentin 300 mg p.o. at bedtime, Culturelle 1 cap p.o.

q.p.m., Lamictal 100 mg p.o. b.i.d., levalbuterol tartrate 2 puffs inhaled q.

4 hours p.r.n., levothyroxine 75 mcg p.o. daily, lisinopril 10 mg p.o.

q.p.m., Ativan 0.25 mg at bedtime, Toprol-XL 25 mg p.o. q.p.m. Dulera 100/5

two puffs inhaled q. 12 hours, nitroglycerin 0.4 mg UD p.r.n., Seroquel 25 mg

p.o. at bedtime, Effexor .5 mg p.o. q.a.m.

 

ALLERGIES:  ADHESIVES, BACITRACIN, NEOMYCIN, AND POLYMYXIN B.

 

REVIEW OF SYSTEMS:  As per admitting history and physical.

 

PHYSICAL EXAMINATION:  Focusing on her right shoulder, she is not in a sling

at this time and is sitting in a chair at the bedside.  She is awake and

alert and oriented to person and place, and time.  She is in mild pain due to

her dislocation of her right shoulder.  She has obvious deformity of the

right shoulder with the anterior dislocation.  She is mildly tender over the

shoulder at this time on palpation and has some swelling.  She has an

abrasion noted over the right elbow that has been bandaged.  She has no pain

at the elbow and can move her elbow at this time but causes discomfort in her

right shoulder.  She has no pain in the right wrist.  She has good hand

strength of the right hand at this time compared to the left and they appear

both equal.  She is able to actively flex and extend all fingers of the hand

and is able to do flexion and extension of the wrist, although she states it

bothers her to do so with pain in her shoulder because she is moving her arm

a little bit more.  She has some slight pins and needles feelings in her

fingers, but otherwise no other gross motor or sensory loss seen at this

time.

 

ASSESSMENT:  Right anterior dislocation of the shoulder with a greater

tuberosity fracture and a Bankart fracture of the glenoid per CT scan.

 

PLAN:  The patient will be kept n.p.o. and she will need to be taken down to

the operating room for at the very minimum closed reduction of the anterior

dislocation of the shoulder versus open reduction.  X-rays and history will be

discussed with Dr. Teixeira who is here today.

 

 

 

TAISHA

## 2018-03-19 NOTE — EMERGENCY ROOM VISIT NOTE
Pre-Mod Sedation Assessment


General


Date of Moderate Sedation:


Mar 19, 2018.


Vital Signs:





Vital Signs Past 12 Hours








  Date Time  Temp Pulse Resp B/P (MAP) Pulse Ox O2 Delivery O2 Flow Rate FiO2


 


3/18/18 23:42  63      


 


3/18/18 23:36  63 18 152/66 100 Room Air  


 


3/18/18 22:08  82 18 137/57 98 Nasal Cannula 2.0 


 


3/18/18 19:53 36.8 78 18 161/118 98 Room Air  











Pre-Sedation Airway Assessment


Oral Cavity:  WNL


Able to Visualize Vocal Cords:  No


Short Thick Neck:  No


Hx of Sleep Apnea:  No


Smoking Status:  Never Smoker


Mallampati Classification:  Class II





Procedure Planning


Contraindications-for Mod Sed:  None





Notes








The planned sedation has been discussed with the patient and consent obtained.  

I have


identified the patient, determined the appropriateness of sedation and have 

assessed the


patient immediately prior to the procedure.  





All medicine(s) and interventions are by my order.

## 2018-03-20 VITALS
SYSTOLIC BLOOD PRESSURE: 114 MMHG | HEART RATE: 75 BPM | TEMPERATURE: 97.7 F | OXYGEN SATURATION: 97 % | DIASTOLIC BLOOD PRESSURE: 57 MMHG

## 2018-03-20 VITALS
TEMPERATURE: 97.88 F | HEART RATE: 81 BPM | SYSTOLIC BLOOD PRESSURE: 103 MMHG | OXYGEN SATURATION: 93 % | DIASTOLIC BLOOD PRESSURE: 56 MMHG

## 2018-03-20 VITALS — SYSTOLIC BLOOD PRESSURE: 119 MMHG | HEART RATE: 81 BPM | DIASTOLIC BLOOD PRESSURE: 61 MMHG

## 2018-03-20 VITALS
OXYGEN SATURATION: 93 % | SYSTOLIC BLOOD PRESSURE: 89 MMHG | DIASTOLIC BLOOD PRESSURE: 54 MMHG | HEART RATE: 83 BPM | TEMPERATURE: 97.88 F

## 2018-03-20 VITALS
SYSTOLIC BLOOD PRESSURE: 116 MMHG | OXYGEN SATURATION: 95 % | TEMPERATURE: 97.88 F | HEART RATE: 78 BPM | DIASTOLIC BLOOD PRESSURE: 68 MMHG

## 2018-03-20 VITALS
OXYGEN SATURATION: 94 % | SYSTOLIC BLOOD PRESSURE: 95 MMHG | HEART RATE: 72 BPM | DIASTOLIC BLOOD PRESSURE: 57 MMHG | TEMPERATURE: 98.06 F

## 2018-03-20 VITALS
TEMPERATURE: 98.06 F | HEART RATE: 72 BPM | DIASTOLIC BLOOD PRESSURE: 57 MMHG | SYSTOLIC BLOOD PRESSURE: 95 MMHG | OXYGEN SATURATION: 94 %

## 2018-03-20 LAB
BASOPHILS # BLD: 0.06 K/UL (ref 0–0.2)
BASOPHILS NFR BLD: 0.2 %
BUN SERPL-MCNC: 17 MG/DL (ref 7–18)
CALCIUM SERPL-MCNC: 7.8 MG/DL (ref 8.5–10.1)
CO2 SERPL-SCNC: 23 MMOL/L (ref 21–32)
CREAT SERPL-MCNC: 1.18 MG/DL (ref 0.6–1.2)
EOS ABS #: 0.34 K/UL (ref 0–0.5)
EOSINOPHIL NFR BLD AUTO: 156 K/UL (ref 130–400)
GLUCOSE SERPL-MCNC: 105 MG/DL (ref 70–99)
HCT VFR BLD CALC: 30.2 % (ref 37–47)
HGB BLD-MCNC: 9.4 G/DL (ref 12–16)
IG#: 0.08 K/UL (ref 0–0.02)
IMM GRANULOCYTES NFR BLD AUTO: 81.9 %
LYMPHOCYTES # BLD: 27.38 K/UL (ref 1.2–3.4)
MCH RBC QN AUTO: 29.7 PG (ref 25–34)
MCHC RBC AUTO-ENTMCNC: 31.1 G/DL (ref 32–36)
MCV RBC AUTO: 95.6 FL (ref 80–100)
MONO ABS #: 1.47 K/UL (ref 0.11–0.59)
MONOCYTES NFR BLD: 4.4 %
NEUT ABS #: 4.11 K/UL (ref 1.4–6.5)
NEUTROPHILS # BLD AUTO: 1 %
NEUTROPHILS NFR BLD AUTO: 12.3 %
PMV BLD AUTO: 9.6 FL (ref 7.4–10.4)
POTASSIUM SERPL-SCNC: 4.2 MMOL/L (ref 3.5–5.1)
RED CELL DISTRIBUTION WIDTH CV: 14.7 % (ref 11.5–14.5)
RED CELL DISTRIBUTION WIDTH SD: 50.9 FL (ref 36.4–46.3)
SODIUM SERPL-SCNC: 136 MMOL/L (ref 136–145)
WBC # BLD AUTO: 33.44 K/UL (ref 4.8–10.8)

## 2018-03-20 RX ADMIN — ALUMINUM ZIRCONIUM TRICHLOROHYDREX GLY SCH EA: 0.2 STICK TOPICAL at 00:00

## 2018-03-20 RX ADMIN — ATORVASTATIN CALCIUM SCH MG: 40 TABLET, FILM COATED ORAL at 22:01

## 2018-03-20 RX ADMIN — METOPROLOL SUCCINATE SCH MG: 25 TABLET, EXTENDED RELEASE ORAL at 22:01

## 2018-03-20 RX ADMIN — OXYCODONE HYDROCHLORIDE AND ACETAMINOPHEN PRN TAB: 5; 325 TABLET ORAL at 20:11

## 2018-03-20 RX ADMIN — OXYCODONE HYDROCHLORIDE AND ACETAMINOPHEN PRN TAB: 5; 325 TABLET ORAL at 12:34

## 2018-03-20 RX ADMIN — VENLAFAXINE HYDROCHLORIDE SCH MG: 75 CAPSULE, EXTENDED RELEASE ORAL at 08:50

## 2018-03-20 RX ADMIN — BIMATOPROST SCH DROPS: 0.1 SOLUTION/ DROPS OPHTHALMIC at 22:00

## 2018-03-20 RX ADMIN — OXYCODONE HYDROCHLORIDE AND ACETAMINOPHEN PRN TAB: 5; 325 TABLET ORAL at 05:14

## 2018-03-20 RX ADMIN — ALUMINUM ZIRCONIUM TRICHLOROHYDREX GLY SCH EA: 0.2 STICK TOPICAL at 15:50

## 2018-03-20 RX ADMIN — VENLAFAXINE HYDROCHLORIDE SCH MG: 37.5 CAPSULE, EXTENDED RELEASE ORAL at 08:50

## 2018-03-20 RX ADMIN — ALUMINUM ZIRCONIUM TRICHLOROHYDREX GLY SCH EA: 0.2 STICK TOPICAL at 15:49

## 2018-03-20 RX ADMIN — FLUTICASONE PROPIONATE SCH SPRAYS: 50 SPRAY, METERED NASAL at 15:50

## 2018-03-20 RX ADMIN — LACTOBACILLUS TAB SCH TAB: TAB at 22:01

## 2018-03-20 RX ADMIN — LORAZEPAM SCH MG: 0.5 TABLET ORAL at 22:00

## 2018-03-20 RX ADMIN — ALUMINUM ZIRCONIUM TRICHLOROHYDREX GLY SCH EA: 0.2 STICK TOPICAL at 08:00

## 2018-03-20 RX ADMIN — FERROUS SULFATE TAB EC 325 MG (65 MG FE EQUIVALENT) SCH MG: 325 (65 FE) TABLET DELAYED RESPONSE at 22:00

## 2018-03-20 RX ADMIN — FERROUS SULFATE TAB EC 325 MG (65 MG FE EQUIVALENT) SCH MG: 325 (65 FE) TABLET DELAYED RESPONSE at 08:50

## 2018-03-20 RX ADMIN — LISINOPRIL SCH MG: 10 TABLET ORAL at 22:02

## 2018-03-20 RX ADMIN — VENLAFAXINE HYDROCHLORIDE SCH MG: 150 CAPSULE, EXTENDED RELEASE ORAL at 08:50

## 2018-03-20 RX ADMIN — LEVOTHYROXINE SODIUM SCH MCG: 75 TABLET ORAL at 05:54

## 2018-03-20 RX ADMIN — QUETIAPINE FUMARATE SCH MG: 25 TABLET, FILM COATED ORAL at 22:01

## 2018-03-20 RX ADMIN — GABAPENTIN SCH MG: 300 CAPSULE ORAL at 22:01

## 2018-03-20 NOTE — PROGRESS NOTE
Internal Med Progress Note


Date of Service:


Mar 20, 2018.


Provider Documentation:





SUBJECTIVE:





The patient was seen and examined.


She is status post repair of right shoulder dislocation with fracture.


She complained to have some dizziness this morning while doing physical therapy.


He denies to have any significant pain at rest.


She does not feel like going home today.








OBJECTIVE:





Vital Signs-as noted below





Exam:


General-no distress at rest


Eyes-normal


ENT-normal


Neck-supple


Lungs-clear to auscultation bilaterally


Heart-regular, no murmur appreciated


Abdomen-benign, soft,, bowel sounds present


Extremities-no edema.


Right shoulder and right upper extremity is in a sling.


Neuro-alert awake oriented 3.


Generally weak but no focal neuro deficit.





Lab data as noted below.


ASSESSMENT & PLAN:








Right shoulder dislocation with Greater Tuberosity  Fracture 


S/P Fall 


CT showed right anterior dislocation of the shoulder with greater tuberosity 

fracture and a Bankart fracture of the glenoid 


Denies any chest pain, palpitation and SOB


Appreciate Ortho input


S/P Closed reduction right shoulder dislocation with MAC anesthesia


Clinically better 


Getting PT -felt a little dizzy at PT today 


Likely to go home tomorrow.











Traumatic hemarthrosis


Secondary to fall injury of the right shoulder


Continue monitor Hemoglobin


Remains stable 





CLL


WBC 40K


Stable





 


HTN 


BP was very high on admission 


Mostly situational 


BP stable 








CAD


Denies any symptoms


Aspirin on hold due to surgery


Will continue Statin and metoprolol





Chronic anemia


Hemoglobin stable





Asthma 


Stable





DVT px


SCDs due to surgery





CODE STATUS FULL CODE


Consultants:


Ortho








DISPOSITION


Likely discharge tomorrow


Vital Signs:











  Date Time  Temp Pulse Resp B/P (MAP) Pulse Ox O2 Delivery O2 Flow Rate FiO2


 


3/20/18 15:04 36.5 75 18 114/57 (76) 97 Room Air  


 


3/20/18 11:00 36.6 81 16 103/56 (72) 93 Room Air  


 


3/20/18 09:06 36.7 72 16  94 Room Air  


 


3/20/18 08:12      Room Air  


 


3/20/18 07:10 36.7 72 16 95/57 (70) 94 Room Air  


 


3/20/18 05:15 36.6 78 20 116/68 (84) 95 Room Air  


 


3/20/18 03:55 36.6 83 20 89/54 (66) 93 Room Air  


 


3/19/18 23:50     98 Room Air  


 


3/19/18 23:18 36.5 79 20 111/67 (82) 98 Room Air  


 


3/19/18 21:17  81  101/62 (75)    


 


3/19/18 19:15      Room Air  


 


3/19/18 19:04 36.6 83 18 90/53 (65) 93 Room Air  


 


3/19/18 17:45 36.6 95 16 102/55 (71) 92 Room Air  


 


3/19/18 17:33     96 Room Air  


 


3/19/18 17:30      Room Air  


 


3/19/18 17:15 36.6 79 16 115/50 (71) 96 Room Air  


 


3/19/18 17:05 36.4 79 16 114/52 98 Room Air  


 


3/19/18 16:55  78 16 114/48 98 Room Air  


 


3/19/18 16:47 36.5 80 16 97/49 100 Room Air  








Lab Results:





Results Past 24 Hours








Test


  3/20/18


06:06 Range/Units


 


 


White Blood Count 33.44 4.8-10.8  K/uL


 


Red Blood Count 3.16 4.2-5.4  M/uL


 


Hemoglobin 9.4 12.0-16.0  g/dL


 


Hematocrit 30.2 37-47  %


 


Mean Corpuscular Volume 95.6   fL


 


Mean Corpuscular Hemoglobin 29.7 25-34  pg


 


Mean Corpuscular Hemoglobin


Concent 31.1


  32-36  g/dl


 


 


Platelet Count 156 130-400  K/uL


 


Mean Platelet Volume 9.6 7.4-10.4  fL


 


Neutrophils (%) (Auto) 12.3  %


 


Lymphocytes (%) (Auto) 81.9  %


 


Monocytes (%) (Auto) 4.4  %


 


Eosinophils (%) (Auto) 1.0  %


 


Basophils (%) (Auto) 0.2  %


 


Neutrophils # (Auto) 4.11 1.4-6.5  K/uL


 


Lymphocytes # (Auto) 27.38 1.2-3.4  K/uL


 


Monocytes # (Auto) 1.47 0.11-0.59  K/uL


 


Eosinophils # (Auto) 0.34 0-0.5  K/uL


 


Basophils # (Auto) 0.06 0-0.2  K/uL


 


RDW Standard Deviation 50.9 36.4-46.3  fL


 


RDW Coefficient of Variation 14.7 11.5-14.5  %


 


Immature Granulocyte % (Auto) 0.2  %


 


Immature Granulocyte # (Auto) 0.08 0.00-0.02  K/uL


 


Smudge Cells PRESENT  


 


Sodium Level 136 136-145  mmol/L


 


Potassium Level 4.2 3.5-5.1  mmol/L


 


Chloride Level 105   mmol/L


 


Carbon Dioxide Level 23 21-32  mmol/L


 


Anion Gap 8.0 3-11  mmol/L


 


Blood Urea Nitrogen 17 7-18  mg/dl


 


Creatinine


  1.18


  0.60-1.20


mg/dl


 


Est Creatinine Clear Calc


Drug Dose 28.0


   ml/min


 


 


Estimated GFR (


American) 48.4


   


 


 


Estimated GFR (Non-


American 41.7


   


 


 


BUN/Creatinine Ratio 14.7 10-20  


 


Random Glucose 105 70-99  mg/dl


 


Calcium Level 7.8 8.5-10.1  mg/dl

## 2018-03-20 NOTE — CONSULTANT RECOMMENDATIONS
Consultant Recommendations


Date of Service


Mar 20, 2018.





Consultant Recommendations


Maintain sling at all times unless showering/getting dressed


Follow PT/OT instructions taught at hospital.


No range of motion of the shoulder at this time until seen back in the office 

for follow up





Follow up with Dr Lowry in 2 weeks.  Call for appointment.  [phone]

## 2018-03-20 NOTE — ANESTHESIOLOGY PROGRESS NOTE
Anesthesia Post Op Note


Date & Time


Mar 20, 2018 at 07:56





Vital Signs


Pain Intensity:  4.0





Vital Signs Past 12 Hours








  Date Time  Temp Pulse Resp B/P (MAP) Pulse Ox O2 Delivery O2 Flow Rate FiO2


 


3/20/18 07:10 36.7 72 16 95/57 (70) 94 Room Air  


 


3/20/18 05:15 36.6 78 20 116/68 (84) 95 Room Air  


 


3/20/18 03:55 36.6 83 20 89/54 (66) 93 Room Air  


 


3/19/18 23:50     98 Room Air  


 


3/19/18 23:18 36.5 79 20 111/67 (82) 98 Room Air  


 


3/19/18 21:17  81  101/62 (75)    











Notes


Mental Status:  alert / awake / arousable, participated in evaluation


Pt Amnestic to Procedure:  Yes


Nausea / Vomiting:  adequately controlled


Pain:  adequately controlled


Airway Patency, RR, SpO2:  stable & adequate


BP & HR:  stable & adequate


Hydration State:  stable & adequate


Anesthetic Complications:  no major complications apparent

## 2018-03-21 VITALS
DIASTOLIC BLOOD PRESSURE: 53 MMHG | HEART RATE: 74 BPM | OXYGEN SATURATION: 94 % | TEMPERATURE: 97.88 F | SYSTOLIC BLOOD PRESSURE: 89 MMHG

## 2018-03-21 VITALS — OXYGEN SATURATION: 93 %

## 2018-03-21 VITALS
OXYGEN SATURATION: 99 % | DIASTOLIC BLOOD PRESSURE: 68 MMHG | SYSTOLIC BLOOD PRESSURE: 112 MMHG | TEMPERATURE: 98.6 F | HEART RATE: 77 BPM

## 2018-03-21 VITALS
OXYGEN SATURATION: 93 % | DIASTOLIC BLOOD PRESSURE: 68 MMHG | TEMPERATURE: 98.6 F | SYSTOLIC BLOOD PRESSURE: 112 MMHG | HEART RATE: 71 BPM

## 2018-03-21 VITALS
OXYGEN SATURATION: 96 % | HEART RATE: 86 BPM | DIASTOLIC BLOOD PRESSURE: 59 MMHG | TEMPERATURE: 97.88 F | SYSTOLIC BLOOD PRESSURE: 101 MMHG

## 2018-03-21 VITALS — OXYGEN SATURATION: 97 %

## 2018-03-21 LAB
BASOPHILS # BLD: 0.04 K/UL (ref 0–0.2)
BASOPHILS NFR BLD: 0.1 %
EOS ABS #: 0.34 K/UL (ref 0–0.5)
EOSINOPHIL NFR BLD AUTO: 172 K/UL (ref 130–400)
HCT VFR BLD CALC: 30.6 % (ref 37–47)
HGB BLD-MCNC: 9.7 G/DL (ref 12–16)
IG#: 0.09 K/UL (ref 0–0.02)
IMM GRANULOCYTES NFR BLD AUTO: 80.4 %
LYMPHOCYTES # BLD: 26.47 K/UL (ref 1.2–3.4)
MCH RBC QN AUTO: 30 PG (ref 25–34)
MCHC RBC AUTO-ENTMCNC: 31.7 G/DL (ref 32–36)
MCV RBC AUTO: 94.7 FL (ref 80–100)
MONO ABS #: 1.41 K/UL (ref 0.11–0.59)
MONOCYTES NFR BLD: 4.3 %
NEUT ABS #: 4.56 K/UL (ref 1.4–6.5)
NEUTROPHILS # BLD AUTO: 1 %
NEUTROPHILS NFR BLD AUTO: 13.9 %
PMV BLD AUTO: 9.3 FL (ref 7.4–10.4)
RED CELL DISTRIBUTION WIDTH CV: 14.5 % (ref 11.5–14.5)
RED CELL DISTRIBUTION WIDTH SD: 49.8 FL (ref 36.4–46.3)
WBC # BLD AUTO: 32.91 K/UL (ref 4.8–10.8)

## 2018-03-21 RX ADMIN — LEVOTHYROXINE SODIUM SCH MCG: 75 TABLET ORAL at 06:17

## 2018-03-21 RX ADMIN — ALUMINUM ZIRCONIUM TRICHLOROHYDREX GLY SCH EA: 0.2 STICK TOPICAL at 00:00

## 2018-03-21 RX ADMIN — FERROUS SULFATE TAB EC 325 MG (65 MG FE EQUIVALENT) SCH MG: 325 (65 FE) TABLET DELAYED RESPONSE at 08:49

## 2018-03-21 RX ADMIN — ALUMINUM ZIRCONIUM TRICHLOROHYDREX GLY SCH EA: 0.2 STICK TOPICAL at 08:00

## 2018-03-21 RX ADMIN — VENLAFAXINE HYDROCHLORIDE SCH MG: 37.5 CAPSULE, EXTENDED RELEASE ORAL at 08:48

## 2018-03-21 RX ADMIN — OXYCODONE HYDROCHLORIDE AND ACETAMINOPHEN PRN TAB: 5; 325 TABLET ORAL at 06:18

## 2018-03-21 RX ADMIN — VENLAFAXINE HYDROCHLORIDE SCH MG: 150 CAPSULE, EXTENDED RELEASE ORAL at 08:48

## 2018-03-21 RX ADMIN — VENLAFAXINE HYDROCHLORIDE SCH MG: 75 CAPSULE, EXTENDED RELEASE ORAL at 08:49

## 2018-03-21 NOTE — PROGRESS NOTE
Internal Med Progress Note


Date of Service:


Mar 21, 2018.


Provider Documentation:





SUBJECTIVE:





The patient was seen and examined.


She is status post repair of right shoulder dislocation with fracture.


No more dizziness and no other symptoms


Ready to be discharged today 








OBJECTIVE:





Vital Signs-as noted below





Exam:


General-no distress at rest


Eyes-normal


ENT-normal


Neck-supple


Lungs-clear to auscultation bilaterally


Heart-regular, no murmur appreciated


Abdomen-benign, soft,, bowel sounds present


Extremities-no edema.


Right shoulder and right upper extremity is in a sling.


Neuro-alert awake oriented 3.


Generally weak but no focal neuro deficit.





Lab data as noted below.


ASSESSMENT & PLAN:








Right shoulder dislocation with Greater Tuberosity  Fracture 


S/P Fall 


CT showed right anterior dislocation of the shoulder with greater tuberosity 

fracture and a Bankart fracture of the glenoid 


Denies any chest pain, palpitation and SOB


Appreciate Ortho input


S/P Closed reduction right shoulder dislocation with MAC anesthesia


Clinically better 


Getting PT -felt a little dizzy at PT today 


Went for PT -did well and without any symptoms


Can be discharged home today 











Traumatic hemarthrosis


Secondary to fall injury of the right shoulder


Continue monitor Hemoglobin


Remains stable at 9.7 today 





CLL


WBC 40K


Stable





 


HTN 


BP was very high on admission 


Mostly situational 


BP stable 








CAD


Denies any symptoms


Aspirin on hold due to surgery


Will continue Statin and metoprolol





Chronic anemia


Hemoglobin stable





Asthma 


Stable





DVT px


SCDs due to surgery





CODE STATUS FULL CODE


Consultants:


Ortho








DISPOSITION


Discharge today


Vital Signs:











  Date Time  Temp Pulse Resp B/P (MAP) Pulse Ox O2 Delivery O2 Flow Rate FiO2


 


3/21/18 11:48 37.0 77 16 112/68 (83) 99 Room Air  


 


3/21/18 09:17     93 Room Air  


 


3/21/18 08:18      Room Air  


 


3/21/18 07:51 37.0 71 18 112/68 (83) 93 Room Air  


 


3/21/18 03:30 36.6 86 18 101/59 (73) 96 Room Air  


 


3/21/18 00:29 36.6 74 18 89/53 (65) 94 Room Air  


 


3/21/18 00:10     97 Room Air 2.0 


 


3/20/18 21:56  81  119/61 (80)    


 


3/20/18 20:06      Room Air  


 


3/20/18 15:04 36.5 75 18 114/57 (76) 97 Room Air  








Lab Results:





Results Past 24 Hours








Test


  3/21/18


06:33 Range/Units


 


 


White Blood Count 32.91 4.8-10.8  K/uL


 


Red Blood Count 3.23 4.2-5.4  M/uL


 


Hemoglobin 9.7 12.0-16.0  g/dL


 


Hematocrit 30.6 37-47  %


 


Mean Corpuscular Volume 94.7   fL


 


Mean Corpuscular Hemoglobin 30.0 25-34  pg


 


Mean Corpuscular Hemoglobin


Concent 31.7


  32-36  g/dl


 


 


Platelet Count 172 130-400  K/uL


 


Mean Platelet Volume 9.3 7.4-10.4  fL


 


Neutrophils (%) (Auto) 13.9  %


 


Lymphocytes (%) (Auto) 80.4  %


 


Monocytes (%) (Auto) 4.3  %


 


Eosinophils (%) (Auto) 1.0  %


 


Basophils (%) (Auto) 0.1  %


 


Neutrophils # (Auto) 4.56 1.4-6.5  K/uL


 


Lymphocytes # (Auto) 26.47 1.2-3.4  K/uL


 


Monocytes # (Auto) 1.41 0.11-0.59  K/uL


 


Eosinophils # (Auto) 0.34 0-0.5  K/uL


 


Basophils # (Auto) 0.04 0-0.2  K/uL


 


RDW Standard Deviation 49.8 36.4-46.3  fL


 


RDW Coefficient of Variation 14.5 11.5-14.5  %


 


Immature Granulocyte % (Auto) 0.3  %


 


Immature Granulocyte # (Auto) 0.09 0.00-0.02  K/uL


 


Smudge Cells PRESENT

## 2018-03-21 NOTE — DISCHARGE INSTRUCTIONS
Discharge Instructions


Date of Service


Mar 21, 2018.





Admission


Reason for Admission:  Closed Fracture Dislocation Of Rt Shoulder





Discharge


Discharge Diagnosis / Problem:  Rt Shoulder dislocation and fracture -correcter 

,CAD-stable





Discharge Goals


Goal(s):  Prevent Disease Progression





Activity Recommendations


Activity Limitations:  resume your previous activity





.





Instructions / Follow-Up


Instructions / Follow-Up


Dr Romeo on 3/28/18 at 11:05 AM ( Dr Do is away)





Current Hospital Diet


Patient's current hospital diet: AHA Diet (Heart Healthy)





Discharge Diet


Recommended Diet:  AHA Diet (Heart Healthy)





Procedures


Procedures Performed:  


Closed reduction right shoulder dislocation with MAC anesthesia





Pending Studies


Studies pending at discharge:  no





Medical Emergencies








.


Who to Call and When:





Medical Emergencies:  If at any time you feel your situation is an emergency, 

please call 911 immediately.





.





Non-Emergent Contact


Non-Emergency issues call your:  Primary Care Provider





.





Past History


Medical & Surgical History:  


(1) Closed fracture dislocation of right shoulder


(2) Hypothyroidism


(3) Hypertension


(4) Depression


(5) Bronchitis


(6) Coronary artery disease


(7) Chronic lymphocytic leukemia


(8) History of tonsillectomy


(9) H/O dilation and curettage


(10) History of breast mammoplasty


(11) History of cataract surgery


(12) H/O hernia repair


.








"Provider Documentation" section prepared by Micah Phillip.








.





Consultant Recommendations


Consultant Recommendations:


Maintain sling at all times unless showering/getting dressed


Follow PT/OT instructions taught at hospital.


No range of motion of the shoulder at this time until seen back in the office 

for follow up





Follow up with Dr Lowry in 2 weeks.  Call for appointment.  [phone]

## 2018-03-21 NOTE — DISCHARGE SUMMARY
Discharge Summary


Date of Service


Mar 21, 2018.





Discharge Summary


Admission Date:


Mar 19, 2018 at 03:11


Discharge Date:  Mar 21, 2018


Discharge Disposition:  Home


Principal Diagnosis:


Rt Shoulder dislocation and fracture -corrected ,CAD-stable


Secondary Diagnoses/Problems:


Please see H&P and Hospital Progress note


Consultations:


Ortho





Medication Reconciliation


New Medications:  


Oxycodone/Acetaminophen 5MG/325MG (Percocet 5MG/325MG)  Tab


1 TAB PO Q8H PRN for Pain for 7 Days, #20 TAB


PAIN


 


Continued Medications:  


Acetaminophen/Codeine (Tylenol W/Codeine #3) 300 Mg/30 Mg Tab


1 TAB PO BID PRN for Pain, TAB





Aspirin (Aspirin) 81 Mg Tab


81 MG PO QPM





Atorvastatin (Lipitor) 40 Mg Tab


40 MG PO QPM





Azelastine Hcl (Astelin Nasal Spray) 200 Sprays/30 Ml [place]


1 SPRAY JULIO BID





Bimatoprost (Lumigan) 0.01 % Sol


1 DROP OPB HS, ML





Calcium Carbonate-Vitamin D (Calcium 600 + D) 1 Tab Tab


1 TABS PO QPM





Cholecalciferol (Vitamin D3) 1,000 Unit Tab


2 TABS PO DAILY





Estrogens, Conjugated (Premarin) 14 Appln/30 Gm Cr


1 APPLN PV 2-3xweek





Ferrous Sulfate (Ferrous Sulfate) 325 Mg Tab


325 MG PO BID





Fluticasone Propionate (Nasal) (Flonase Allergy Relief) 50 Mcg/Act Spr


2 SPRAYS JULIO DAILY AFTERNOON





Gabapentin (Neurontin) 300 Mg Cap


300 MG PO HS, 0 Refills





Lactobacillus-Inulin (Culturelle) 1 Cap Cap


1 CAP PO QPM





Lamotrigine (Lamictal) 100 Mg Tab


100 MG PO BID, TAB





Levalbuterol Tartrate (Levalbuterol Tartrate Hfa) 45 Mcg/Act Aer


2 PUFFS INH Q4H PRN for PRN





Levothyroxine Sodium (Levothyroxine Sodium) 75 Mcg Tab


75 MG PO DAILY





Lisinopril (Zestril) 10 Mg Tab


10 MG PO QPM





Lorazepam (Ativan) 0.5 Mg Tab


0.25 MG PO HS





Metoprolol Succinate (Toprol Xl) 25 Mg Tab


25 MG PO QPM





Mometasone Furoate-Formoterol (Dulera 100/5 Mcg) 1 Aer Aer


2 PUFFS INH Q12, 2 Refills





Nitroglycerin (Nitrostat) 0.4 Mg Tab


0.4 MG UT UD PRN for Chest Pain, 0 Refills


PLACE ONE TABLET UNDER THE TONGUE EVERY 5 MINUTES FOR UP TO 3 DOSES


 IF NEEDED FOR CHEST PAIN.


Quetiapine Fumarate (Seroquel) 25 Mg Tab


25 MG PO HS





Venlafaxine Hcl (Effexor Xr) 75 Mg Cap


262.5 MG PO QAM


TAKE 150 MG + 75 MG + 37.5 MG FOR TOTAL .5 MG EACH MORNING


Venlafaxine Hcl (Effexor Xr) 150 Mg Cap


150 MG PO DAILY


TAKE 150 MG + 75 MG + 37.5 MG FOR TOTAL .5 MG EACH MORNING


Venlafaxine Hcl (Effexor Xr) 37.5 Mg Cap


37.5 MG PO DAILY


TAKE 150 MG + 75 MG + 37.5 MG FOR TOTAL .5 MG EACH MORNING








Admission Information


HPI (per Admitting provider):


DATE OF ADMISSION:  03/19/2018


 


PRIMARY CARE DOCTOR:  Dr. Do.


 


CHIEF COMPLAINT:  Right shoulder pain, fall.


 


HISTORY OF PRESENT ILLNESS:  





History obtained from the patient and records.  





Medical history significant for hypertension,


hyperlipidemia, chronic lymphocytic leukemia, asthma, CAD, PVD as per


records, hypothyroidism.  Chronic anemia (baseline hemoglobin of


10 to 11)





Recent confinement last November 2017 for UTI.





Patient was helping out a neighbor who had a fall tonight. 


Running up the steps, she fell down on her right shoulder, excruciating pain.


No chest pain.  No shortness of breath.  No syncope.  No head trauma. 





At the Emergency Room, attempted closed reduction done under conscious sedation


was unsuccessful.





Medical history as above :





Recent history of Lyme disease sp treatment.  


 


SURGERIES:  Tonsillectomy, adenoidectomy, and breast reduction.


 


HOME MEDICATIONS:  Include aspirin, Lipitor, Astelin, Tylenol, Lumigan,


calcium plus D, vitamin D3, Premarin, ferrous sulfate, Flonase, Neurontin,


Culturelle, levothyroxine, Zestril, Ativan, Lamictal, albuterol, Toprol-XL,


Dulera, Nitrostat, and Effexor.


 


ALLERGIES:  TO ADHESIVE, BACITRACIN, NEOMYCIN, AND POLYMYXIN.


 


FAMILY HISTORY:  Heart disease.


 


PERSONAL AND SOCIAL HISTORY:  Nonsmoker.  No chronic ETOH intake.  Retired 

nurse. 





REVIEW OF SYSTEMS:  As per HPI, all 10 systems reviewed, all other ROS


negative.


 


PHYSICAL EXAMINATION:


VITAL SIGNS:  Blood pressure was noted to be 170/70, pulse rate 82, RR 18 T 37,


sats 98 on room air.


GENERAL:  Noted to be slightly uncomfortable, anxious, no respiratory


distress.


SKIN:  Pallor, warm.


HEENT:  Pale palpebral conjunctivae.  No ptosis.  Dry mucosa.


NECK:  Supple, nontender.


CHEST:  Poor effort.  No tenderness.


HEART:  Regular rate and rhythm, systolic murmur.


ABDOMEN:  Some distention, nontender.


EXTREMITIES:  Sling on the right upper extremity with tenderness, ecchymosis


to the right upper extremity.


NEUROLOGIC:  Coherent.  No gross focality.


 


LABORATORY DATA:  Hemoglobin was noted to be 10.4, hematocrit 32.6, white


blood cells 40, and platelets noted to be 170.  Sodium noted to be 140,


potassium 4.2, chloride 108, CO2 of 20, BUN 14, creatinine 1, glucose was


noted to be 81.  





Shoulder CT initial read, anterior glenohumeral joint


dislocation with humeral head and neck in subcoracoid position associated


with fracture through the greater tuberosity of the humerus, greater


tuberosity fragment measuring 2.2 cm remained located in the glenoid fossa,


4.2 cm of dislocated distal humerus, nondisplaced fracture through anterior


glenoid rim, hemarthrosis, and soft tissue swelling.


 


ASSESSMENT:  





1. Traumatic right shoulder dislocation status post incomplete/unsuccessful 

reduction at the ER


2. Traumatic hemarthrosis 2 to fall injury 


hemoglobin stable


3.  chronic lymphocytic leukemia, in remission as per records. 


4.  HTN urgency


secondary to pain, missed PM meds


5.  coronary artery disease, peripheral vascular disease as per records


6.  chronic anemia, hemoglobin at baseline,


7.  asthma stable


8.  hx Lyme dse status post recent treatment.


 


PLAN:  





[facility]


analgesia


Orthopedics consult RE traumatic right shoulder dislocation


(ER provider already in touch with Dr. Irby.)


Trend HH RE hemarthrosis, hold home ASA until hemoglobin stable 


Facilitate nighttime BP meds 


DVT prophylaxis, SCDs  RE hemarthrosis


DNR.  





Patient's  requesting for updates from providers, 


Mr. Lisandro Gale thru contact number [phone].





Hospital Course








Right shoulder dislocation with Greater Tuberosity  Fracture 


S/P Fall 


CT showed right anterior dislocation of the shoulder with greater tuberosity 

fracture and a Bankart fracture of the glenoid 


Denies any chest pain, palpitation and SOB


Appreciate Ortho input


S/P Closed reduction right shoulder dislocation with MAC anesthesia


Clinically better 


Getting PT -felt a little dizzy at PT today 


Went for PT -did well and without any symptoms


Can be discharged home today 











Traumatic hemarthrosis


Secondary to fall injury of the right shoulder


Continue monitor Hemoglobin


Remains stable at 9.7 today 





CLL


WBC 40K


Stable





 


HTN 


BP was very high on admission 


Mostly situational 


BP stable 








CAD


Denies any symptoms


Aspirin on hold due to surgery


Will continue Statin and metoprolol





Chronic anemia


Hemoglobin stable





Asthma 


Stable





DVT px


SCDs due to surgery





CODE STATUS FULL CODE


Consultants:


Ortho








DISPOSITION


Discharge today


Total time spent on discharge = 35 minutes


This includes examination of the patient, discharge planning, medication 

reconciliation, and communication with other providers.





Discharge Instructions


Date of Service


Mar 21, 2018.





Admission


Reason for Admission:  Closed Fracture Dislocation Of Rt Shoulder





Discharge


Discharge Diagnosis / Problem:  Rt Shoulder dislocation and fracture -corrected 

,CAD-stable





Discharge Goals


Goal(s):  Prevent Disease Progression





Activity Recommendations


Activity Limitations:  resume your previous activity





.





Instructions / Follow-Up


Instructions / Follow-Up


Dr Romeo on 3/28/18 at 11:05 AM ( Dr Do is away)





Current Hospital Diet


Patient's current hospital diet: AHA Diet (Heart Healthy)





Discharge Diet


Recommended Diet:  AHA Diet (Heart Healthy)





Procedures


Procedures Performed:  


Closed reduction right shoulder dislocation with MAC anesthesia





Pending Studies


Studies pending at discharge:  no





Medical Emergencies








.


Who to Call and When:





Medical Emergencies:  If at any time you feel your situation is an emergency, 

please call 911 immediately.





.





Non-Emergent Contact


Non-Emergency issues call your:  Primary Care Provider





.





Past History


Medical & Surgical History:  


(1) Closed fracture dislocation of right shoulder


(2) Hypothyroidism


(3) Hypertension


(4) Depression


(5) Bronchitis


(6) Coronary artery disease


(7) Chronic lymphocytic leukemia


(8) History of tonsillectomy


(9) H/O dilation and curettage


(10) History of breast mammoplasty


(11) History of cataract surgery


(12) H/O hernia repair


.








"Provider Documentation" section prepared by Micah Phillip.








.





Consultant Recommendations


Consultant Recommendations:


Maintain sling at all times unless showering/getting dressed


Follow PT/OT instructions taught at hospital.


No range of motion of the shoulder at this time until seen back in the office 

for follow up





Follow up with Dr Lowry in 2 weeks.  Call for appointment.  [phone]











<Electronically signed by Micah Phillip M.D.>





  Signed:  03/21/18 1208





Additional Copies To


Germán Do D.O.

## 2018-04-12 NOTE — HISTORY & PHYSICAL EXAMINATION
DATE OF ADMISSION:  04/12/2018

 

CHIEF COMPLAINT:  Right shoulder injury.

 

HISTORY OF PRESENT ILLNESS:  This is an 86-year-old female patient of Dr. Teixeira's complaining of right shoulder injury on 03/19/2018.  The patient

apparently fell on her right shoulder.  She did dislocate her shoulder.  She

reported to the Emergency Department where it was unable to be relocated

without anesthesia.  She was taken to the operating room where it was

reduced.  She has been diagnosed with a fracture of her greater tuberosities

with a displaced fracture of her humeral head as a result of the dislocation.

 Patient wished to proceed with a right shoulder open reduction and internal

fixation of greater tuberosity versus reversed total shoulder arthroplasty.

 

PAST MEDICAL HISTORY:  Hypertension, hypercholesterolemia, asthma, anxiety,

anemia, chronic lymphocytic leukemia or CLL, osteoarthritis, neck problems,

back problems, hiatal hernia, stage IV kidney failure.

 

SOCIAL HISTORY:  Nonsmoker, nondrinker.

 

PAST SURGICAL HISTORY:  Tonsillectomy, breast biopsy, colonoscopy, D&C,

cataract surgery, multiple times hernia repair, oral surgery, right shoulder

surgery.

 

FAMILY HISTORY:  Noncontributory.

 

REVIEW OF SYSTEMS:  Patient complains of chronic right shoulder pain and

instability since her injury.  Otherwise, denies any shortness of breath,

chest pain, nausea, vomiting or any joint complaints.

 

MEDICATIONS:

1.  Ativan 0.25 mg daily.

2.  Effexor 150 mg at bedtime.

3.  Lamictal 100 mg q.a.m. and 100 mg q.p.m.

4.  Seroquel 25 mg at bedtime.

5.  Atorvastatin 40 mg daily.

6.  Lisinopril 10 mg daily.

7.  Metoprolol 25 mg daily.

8.  Neurontin 300 mg daily.

9.  NitroQuick 0.4 mg sublingual as needed for chest pain.

10.  Iron tablet b.i.d.

11.  Astelin, 1 squirt each nostril b.i.d.

12.  Dulera inhalation 2 puffs every 12 hours.

13.  Flonase 1 squirt each nostril daily.

14.  Xopenex every 4 hours as needed for chest pain.

15.  Tylenol with codeine b.i.d. p.r.n.

16.  Aspirin 81 mg daily.

17.  Calcium 600 plus D daily.

18.  Levoxyl 75 mcg daily.

19.  Lumigan 0.01% eye drops 1 in each eye at bedtime.

20.  Procrit 40, 000 units weekly for hemoglobin less than 12.

21.  Boost strips.

 

ALLERGIES:  TOPICAL NEOSPORIN, ADHESIVE TAPE.

 

PHYSICAL EXAMINATION:

GENERAL:  Well-developed, well-nourished 86-year-old female in no acute

distress.  She is alert and oriented x3 and pleasant.

HEENT:  Normocephalic, atraumatic.  Extraocular muscles are intact.  Pupils

are equal and reactive to light.

HEART:  Regular rate and rhythm, no murmurs appreciated.

LUNGS:  Clear.

ABDOMEN:  Soft, bowel sounds are present.

EXTREMITIES:  Right shoulder range of motion and strength were deferred. 

Range of motion of her elbow, wrist and hand were intact.

NEUROLOGICAL:  Neurovascularly she is intact in her right upper extremity.

 

DIAGNOSES:  Right shoulder greater tuberosity fractures status post

dislocation.  She has a history of hypertension, hypercholesterolemia,

asthma, anxiety, anemia, chronic lymphocytic leukemia, osteoarthritis, neck

problems, hiatal hernia, stage IV kidney failure.

 

PLAN:  Patient was advised of her diagnoses.  Indications, risks, benefits,

postop course have all been reviewed.  Patient wished to proceed with a right

shoulder open reduction and internal fixation of greater tuberosity fractures

versus a reversed total shoulder arthroplasty.  Necessary consent forms,

preoperative testing and clearances will be obtained.

## 2018-04-13 ENCOUNTER — HOSPITAL ENCOUNTER (INPATIENT)
Dept: HOSPITAL 45 - C.ACU | Age: 83
LOS: 3 days | Discharge: HOME HEALTH | DRG: 483 | End: 2018-04-16
Attending: ORTHOPAEDIC SURGERY | Admitting: ORTHOPAEDIC SURGERY
Payer: COMMERCIAL

## 2018-04-13 VITALS — DIASTOLIC BLOOD PRESSURE: 75 MMHG | SYSTOLIC BLOOD PRESSURE: 117 MMHG | HEART RATE: 91 BPM

## 2018-04-13 VITALS
BODY MASS INDEX: 25.32 KG/M2 | HEIGHT: 60 IN | WEIGHT: 128.97 LBS | HEIGHT: 60 IN | WEIGHT: 128.97 LBS | BODY MASS INDEX: 25.32 KG/M2 | BODY MASS INDEX: 25.32 KG/M2

## 2018-04-13 VITALS
DIASTOLIC BLOOD PRESSURE: 54 MMHG | OXYGEN SATURATION: 97 % | HEART RATE: 93 BPM | SYSTOLIC BLOOD PRESSURE: 113 MMHG | TEMPERATURE: 98.06 F

## 2018-04-13 VITALS
SYSTOLIC BLOOD PRESSURE: 109 MMHG | TEMPERATURE: 98.24 F | HEART RATE: 84 BPM | OXYGEN SATURATION: 100 % | DIASTOLIC BLOOD PRESSURE: 61 MMHG

## 2018-04-13 VITALS — SYSTOLIC BLOOD PRESSURE: 106 MMHG | DIASTOLIC BLOOD PRESSURE: 52 MMHG | HEART RATE: 79 BPM | OXYGEN SATURATION: 100 %

## 2018-04-13 VITALS
SYSTOLIC BLOOD PRESSURE: 93 MMHG | TEMPERATURE: 98.24 F | OXYGEN SATURATION: 96 % | DIASTOLIC BLOOD PRESSURE: 56 MMHG | HEART RATE: 96 BPM

## 2018-04-13 VITALS
TEMPERATURE: 98.42 F | SYSTOLIC BLOOD PRESSURE: 156 MMHG | DIASTOLIC BLOOD PRESSURE: 66 MMHG | HEART RATE: 70 BPM | OXYGEN SATURATION: 96 %

## 2018-04-13 VITALS — SYSTOLIC BLOOD PRESSURE: 107 MMHG | OXYGEN SATURATION: 99 % | DIASTOLIC BLOOD PRESSURE: 60 MMHG | HEART RATE: 86 BPM

## 2018-04-13 VITALS — OXYGEN SATURATION: 98 %

## 2018-04-13 VITALS
DIASTOLIC BLOOD PRESSURE: 55 MMHG | TEMPERATURE: 97.7 F | OXYGEN SATURATION: 100 % | SYSTOLIC BLOOD PRESSURE: 103 MMHG | HEART RATE: 80 BPM

## 2018-04-13 VITALS
SYSTOLIC BLOOD PRESSURE: 111 MMHG | OXYGEN SATURATION: 98 % | TEMPERATURE: 97.88 F | DIASTOLIC BLOOD PRESSURE: 58 MMHG | HEART RATE: 88 BPM

## 2018-04-13 VITALS — SYSTOLIC BLOOD PRESSURE: 89 MMHG | DIASTOLIC BLOOD PRESSURE: 52 MMHG

## 2018-04-13 VITALS — OXYGEN SATURATION: 97 %

## 2018-04-13 DIAGNOSIS — Z98.49: ICD-10-CM

## 2018-04-13 DIAGNOSIS — S43.001A: Primary | ICD-10-CM

## 2018-04-13 DIAGNOSIS — F41.9: ICD-10-CM

## 2018-04-13 DIAGNOSIS — M19.90: ICD-10-CM
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
## 2018-04-13 DIAGNOSIS — I12.9: ICD-10-CM

## 2018-04-13 DIAGNOSIS — J45.909: ICD-10-CM

## 2018-04-13 DIAGNOSIS — C91.10: ICD-10-CM

## 2018-04-13 DIAGNOSIS — S42.251A: ICD-10-CM

## 2018-04-13 DIAGNOSIS — Z79.82: ICD-10-CM

## 2018-04-13 DIAGNOSIS — E78.00: ICD-10-CM

## 2018-04-13 DIAGNOSIS — N18.3: ICD-10-CM

## 2018-04-13 PROCEDURE — 0RRJ00Z REPLACEMENT OF RIGHT SHOULDER JOINT WITH REVERSE BALL AND SOCKET SYNTHETIC SUBSTITUTE, OPEN APPROACH: ICD-10-PCS | Performed by: ORTHOPAEDIC SURGERY

## 2018-04-13 RX ADMIN — CEFAZOLIN SCH MLS/MIN: 10 INJECTION, POWDER, FOR SOLUTION INTRAVENOUS at 16:39

## 2018-04-13 RX ADMIN — GABAPENTIN SCH MG: 300 CAPSULE ORAL at 20:56

## 2018-04-13 RX ADMIN — DEXTROSE MONOHYDRATE, SODIUM CHLORIDE, AND POTASSIUM CHLORIDE SCH MLS/HR: 50; 4.5; 1.49 INJECTION, SOLUTION INTRAVENOUS at 22:23

## 2018-04-13 RX ADMIN — ALUMINUM ZIRCONIUM TRICHLOROHYDREX GLY SCH EA: 0.2 STICK TOPICAL at 16:00

## 2018-04-13 RX ADMIN — ATORVASTATIN CALCIUM SCH MG: 40 TABLET, FILM COATED ORAL at 20:56

## 2018-04-13 RX ADMIN — FERROUS SULFATE TAB EC 325 MG (65 MG FE EQUIVALENT) SCH MG: 325 (65 FE) TABLET DELAYED RESPONSE at 20:55

## 2018-04-13 RX ADMIN — Medication SCH MG: at 20:54

## 2018-04-13 RX ADMIN — DOCUSATE SODIUM SCH MG: 100 CAPSULE, LIQUID FILLED ORAL at 20:54

## 2018-04-13 RX ADMIN — TRANEXAMIC ACID SCH MLS/HR: 100 INJECTION, SOLUTION INTRAVENOUS at 06:30

## 2018-04-13 RX ADMIN — DEXTROSE MONOHYDRATE, SODIUM CHLORIDE, AND POTASSIUM CHLORIDE SCH MLS/HR: 50; 4.5; 1.49 INJECTION, SOLUTION INTRAVENOUS at 13:26

## 2018-04-13 RX ADMIN — BIMATOPROST SCH DROPS: 0.1 SOLUTION/ DROPS OPHTHALMIC at 20:52

## 2018-04-13 RX ADMIN — ACETAMINOPHEN SCH MG: 500 TABLET, COATED ORAL at 13:27

## 2018-04-13 RX ADMIN — ACETAMINOPHEN SCH MG: 500 TABLET, COATED ORAL at 22:23

## 2018-04-13 RX ADMIN — TRANEXAMIC ACID SCH MLS/HR: 100 INJECTION, SOLUTION INTRAVENOUS at 06:59

## 2018-04-13 NOTE — HISTORY & PHYSICAL BRIDGE NOTE
H&P Re-Evaluation


Bridge Note:


I have examined the patient, reviewed the History & Physical and in the 

interval since the performance of the History & Physical I have noted the 

following changes of clinical significance:shoulder anteriorly subluxed or 

dislocated neurovascular intact.

## 2018-04-13 NOTE — DIAGNOSTIC IMAGING REPORT
R SHOULDER MIN 2 VIEWS ROUTINE



CLINICAL HISTORY: Post shoulder surgery    



COMPARISON: Right shoulder radiograph March 18, 2018.



FINDINGS:  Alignment of the reverse total right shoulder arthroplasty is

anatomic. There is no fracture or unexpected radiopaque body. Drains and skin

staples are present.



IMPRESSION: Expected findings following right shoulder arthroplasty.







Electronically signed by:  Cooper Mitchell M.D.

4/13/2018 11:22 AM



Dictated Date/Time:  4/13/2018 11:21 AM

## 2018-04-13 NOTE — OPERATIVE REPORT
DATE OF OPERATION:  04/13/2018

 

INDICATION FOR PROCEDURE:  Patient is an 86-year-old female who suffered a

traumatic anterior dislocation with fracture of greater tuberosity with large

greater tuberosity fracture fragment.  She had closed reduction by Dr. Lowry.  In followup 2-3 weeks later, she was noted to have displacement of

the greater tuberosity fracture posteriorly, anterior/superior subluxation of

the humerus, and clinically has some anterior superior instability.  She has

some minor osteoarthritic changes in the glenohumeral joint only.

 

PREOPERATIVE DIAGNOSES:  Right shoulder recurrent anterior instability,

status post anterior fracture dislocation of the right shoulder with

displaced greater tuberosity fracture.  Possible rotator cuff tear involving

subscapularis with recurrent instability.

 

POSTOPERATIVE DIAGNOSES:  Subscapularis tendon tear, nonrepairable, biceps

tendinopathy, displaced greater tuberosity fracture, recurrent anterior

instability with anterior superior escape right shoulder.

 

PROCEDURES:  Right shoulder reversed total shoulder arthroplasty, repair,

greater tuberosity fracture, and biceps tenodesis.

 

SURGEON:  Dr. Teixeira.

 

ASSISTANT:  Juan Antonio Freeman PA-C.

 

ANESTHESIA:  Regional block and general.

 

OPERATIVE PROCEDURE:  Patient was taken to the operating room, anesthetized

under regional block and general anesthetic.  She was positioned on the

operating table in a 40 degree beachchair position.  A towel was placed in

the medial border of scapula.  She was transferred to the right side of the

bed so that she could be manipulated off the bed as necessary.  Her head was

placed on foam headrest.  She had protective eyewear.  She had TEDs, SCDs,

and a Diaz catheter.  Right shoulder was examined and demonstrated that she

had anterior superior instability with subluxed or anterior dislocated

shoulder and ecchymosis of the arm.  Her right upper extremity was prepped

and draped with ChloraPrep.  Anterior deltopectoral incision was made.  Skin

was incised sharply.  Subcutaneous flaps were elevated.  The cephalic vein

was dissected out laterally and retracted with the deltoid.  The upper

centimeter pectoralis was released for inferior exposure.  There was marked

biceps tendinopathy noted.  The biceps tendon sheath was resected and biceps

tenodesed to the pectoralis tendon and the proximal biceps resected.  The

subscapularis tendon was torn, and the greater tuberosity fracture was

displaced way posterior behind the head.  The greater tuberosity fracture had

teres minor, infraspinatus, and some of the supraspinatus attached to it as

it was a very large fragment.  The subscapularis looked very tendinopathic

and had retracted significantly medially and nonrepairable to this age.  At

this time, the circumflex vessels were tied off with silk ties and cauterized

laterally.  The inferior fibers of subscapularis muscle were split with

Metzenbaum scissors down to the capsule.  Kitner elevator was used to reflect

the fibers of the subscapularis of the inferior capsule.  We placed the blunt

Hohmann retractor to protect the axillary nerve.  Then, I did subperiosteal

capsule release starting in the bicipital groove releasing any remaining

capsule off the lesser tuberosity and some inferior remnants of subscapularis

tendon as we gradually externally rotated the arm.  We stayed with

electrocautery on bone and then used the Browne elevator to free up some of the

inferior capsule off the neck for exposure.  The humeral head demonstrated

there was a large defect with the entire greater tuberosity being fractured

off and displaced posteriorly.  At this time, I made a provisional cut.  I

was considering the fracture stem, so I made the 155 degree cut, made it in

appropriate retroversion, and then, I went ahead and placed retractors to

expose the glenoid.  Glenoid still had labrum and articular cartilage, and

this was resected.  I did release some of the upper rotator interval tissue

and resect a small portion of the supraspinatus to get exposure of the

glenoid.  I did an anterior inferior/posterior inferior capsule release to

fully visualize the glenoid.  At this time, the bone was noted to be very

hard in her proximal humerus, and the tension on the humerus demonstrated

that a fracture stem would be too proud and tight, so I chose to go to an

Ascend Flex PTC type stem which could make a steeper neck cut, and we could

drop this and lower to resect more humeral bone and get better soft tissue

tension and allow the prosthetic to be reduced.  At this point, I placed some

traction sutures around the greater tuberosity fragment and then placed the

cutting guide for the Ascend Flex stem.  A cut was made in 30 degrees

retroversion because of the anterior instability.  This revised the neck

angle cut and removed some more bone so that we would have enough bone

resection to allow reduction of the implant.  At this time, the humeral shaft

was prepared using a sounding awl followed by sequential broaches, and we

chose size 3B long stem.  Actually had very good press fit with the trial, so

I felt we could press fit this instead of cement.  A cup protector was

placed, then humerus was retracted posterior to the glenoid, and then, I used

the 25 mm baseplate because she had very petite sized arm and glenoid.  The

drill hole for the glenoid baseplate was placed to position the glenoid

implant at the inferior aspect of the glenoid to help prevent notching, and

this was placed in about 10 degrees of inferior tilt.  After the drill hole

was made, we used the reamer.  It was noted that she had very hard solid bone

for her age.  I placed anterior/posterior compression screws 4.5 x 18 mm and

superior and inferior locking screws 4.5 x 35 and 4.5 x 29 respectively. 

There was excellent fixation of the baseplate.  A fan reamer was used.  All

bone debris was irrigated out with antibiotic solution with bacitracin, then

the glenoid sphere 36/25 standard Aqualis sphere was impacted on to the

baseplate.  The screw was tightened, and the device as for stability was

stable.  Then attention was taken back to the humerus, and the low offset

(1.5 mm offset) +0 humeral tray was used.  This was dialed to get appropriate

coverage and screwed tightly on to the trial and then we did a trial

reduction with +6 insert.  We were able to reduce this.  It was tight, but

there was no shuck and good stability, and I did not want to sacrifice any

more bone, so we accepted the sizes based on the trials.  At this time, the

trials were removed, and the greater tuberosity was re-exposed, and two #5

FiberWire sutures were placed around the greater tuberosity.  One #5

FiberWire suture was placed transosseously into the hard cortex just anterior

to the bicipital groove and out through the defect in the greater tuberosity

area, and the suture was then passed through the mid portion of the greater

tuberosity fracture fragment pushing the needle through the bone.  I placed 1

more suture in the anterior neck area to repair some of the capsule back at

the end of the procedure.  After all these, #5 FiberWire sutures were placed.

 The canal was copiously irrigated with antibiotic solution and bacitracin,

then the final component was assembled, which was the 3b long stem Ascend

Flex press fit stem assembled to the +0 low offset humeral tray, and then the

humeral insert 36+6 was inserted.  Then this implant was impacted into the

humerus with a good press fit, and we checked stability, it was stable, so no

cement was required.  Then 2 of the sutures around the greater tuberosity

were passed around the neck of the implant.  The implant was reduced to the

glenoid sphere.  Then, the greater tuberosity was reduced anatomically with

the traction sutures and then 2 sutures that went around the neck were tied

pairing the greater tuberosity to the proximal humerus and the third suture

that was placed through the bone of the proximal humerus was tied, with this

suture going through the bone of the greater tuberosity getting anatomic

suturing of the greater tuberosity to the proximal humerus.  I checked the

range of motion, and the repair was secure, then the last suture was placed

with Landen-Oj suture in some of the superior subscap and capsule that was

still able to be brought to the neck, and we used this as a small sling to

help with prevention of any recurrent instability.  The wound was copiously

irrigated.  The pectoralis was repaired with figure-of-eight #2 FiberWire

suture, then we took the arm through range of motion, and with about 110-120

degrees forward elevation, 90 degrees of abduction, and 60-70 degrees of

external rotation, there was no tension on any of the repair, and the

shoulder was completely stable.  The wound was copiously irrigated.  Two

drains were brought out laterally and placed deep to deltopectoral interval. 

Deltopectoral interval was then closed with interrupted figure-of-eight #0

Vicryl sutures.  The subcutaneous tissues were closed with 2-0 Vicryl.  Skin

was closed with staples.  Sterile dressings were applied, and the patient was

placed into a sling immobilizer.

 

DILIP Laura was my first assistant who participated through the entire

procedure.  He assisted in patient positioning, prepping, draping, arm

positioning, soft tissue retraction, instrument management, and performed the

subcutaneous and skin closure and will participate in postoperative care of

patient.

 

 

I attest to the content of the Intraoperative Record and any orders documented therein. Any exception
s are noted below.

## 2018-04-13 NOTE — ANESTHESIOLOGY PROGRESS NOTE
Anesthesia Post Op Note


Date & Time


Apr 13, 2018 at 11:34





Vital Signs


Pain Intensity:  0





Vital Signs Past 12 Hours








  Date Time  Temp Pulse Resp B/P (MAP) Pulse Ox O2 Delivery O2 Flow Rate FiO2


 


4/13/18 11:30 36.5 78 20 122/54 99 Nasal Cannula 2 


 


4/13/18 11:20  74 20 133/59 100 Oxymask 10 


 


4/13/18 11:10  77 21 130/50 100 Oxymask 10 


 


4/13/18 11:00  79 23 132/46 100 Oxymask 10 


 


4/13/18 10:52 36.6 69 12 127/78 100 Oxymask 10 


 


4/13/18 06:06 36.9 70 18 156/66 96 Room Air  











Notes


Mental Status:  alert / awake / arousable, participated in evaluation


Pt Amnestic to Procedure:  Yes


Nausea / Vomiting:  adequately controlled


Pain:  adequately controlled


Airway Patency, RR, SpO2:  stable & adequate


BP & HR:  stable & adequate


Hydration State:  stable & adequate


Anesthetic Complications:  no major complications apparent

## 2018-04-13 NOTE — [facility] POST OPERATIVE BRIEF NOTE
Immediate Operative Summary


Operative Date


Apr 13, 2018.





Pre-Operative Diagnosis





Right Shoulder Anterior Fracture Dislocation with Displaced Greater Tuberosity 


Fracture, Recurrent Anterior Instability





Post-Operative Diagnosis





Right Shoulder Anterior Fracture Dislocation with Displaced Greater


Tuberosity


Fracture, Recurrent Anterior Instability,biceps tendinopathy





Procedure(s) Performed





Right Reserve Total Shoulder Arthroplasty, Repair of Greater Tuberosity 

Fracture 


Right Shoulder, Right Shoulder Biceps Tendonesis





Surgeon


Dr. Cheikh Teixeira





Assistant Surgeon(s)


Juan Antonio rFeeman PA-C





Estimated Blood Loss


150mL





Findings


Consistent with Post-Op Diagnosis





Specimens





Permanent Solution:





A.) Right Humeral Head





Drains


2 hemovac





Anesthesia Type


General Regional





Complication(s)


none





Disposition


Disposition:  Recovery Room / PACU

## 2018-04-13 NOTE — MEDICAL CONSULT
Consultation


Date of Consultation:


Apr 13, 2018.


Attending Physician:


Cheikh Teixeira M.D.


Reason for Consultation:


Postop medical management


History of Present Illness


86-year-old female who is status post right TSH today by Dr. Teixeira.  Patient 

suffered a fall about 1 month ago and fractured her right shoulder.  She 

presented for the planned procedure today.  Postoperatively the patient is 

doing well.  She reports her right upper extremity is still numb from the 

procedure.  She reports her pain is well controlled.  She denies chest pain, 

shortness of breath, palpitations.  No lightheadedness or dizziness.  She 

denies abdominal pain and nausea.  She has not voided since surgery.





Past Medical/Surgical History


Medical Problems:


(1) Asthma


Status: Chronic  





(2) CAD (coronary artery disease)


Permanent Comment: Chronic RCA occlusion


Status: Chronic  





(3) Chronic anemia


Status: Chronic  





(4) CKD (chronic kidney disease), stage III


Status: Chronic  





(5) CLL (chronic lymphocytic leukemia)


Status: Chronic  





(6) Depression


Status: Chronic  





(7) Dyslipidemia


Status: Chronic  





(8) GERD (gastroesophageal reflux disease)


Status: Chronic  





(9) Hypertension


Status: Chronic  





(10) Hypothyroidism


Status: Chronic  





(11) Osteoarthritis


Status: Chronic  





Surgical Problems:


(1) H/O dilation and curettage


Status: Resolved  





(2) H/O hernia repair


Status: Resolved  





(3) History of breast mammoplasty


Status: Resolved  





(4) History of cataract surgery


Status: Resolved  





(5) History of tonsillectomy


Status: Resolved  








Family History


Noncontributory secondary to patient's advanced age





Social History


Smoking Status:  Never Smoker


Alcohol Use:  occasionally





Allergies


Coded Allergies:  


     Adhesives (Verified  Allergy, Intermediate, if on for a while-red blisters

, 4/13/18)


     Bacitracin (Verified  Allergy, Mild, ALLERGY-IRRITATION, 4/13/18)


     Neomycin (Verified  Allergy, Mild, ALLERGY, 4/13/18)


     Polymyxin B (Verified  Allergy, Mild, ALLERGY, 4/13/18)





Home Medications


Percocet 2.5MG/325MG (Oxycodone/Acetaminophen)  Tab 1 Tab PO Q8H


     PRN PAIN


Refresh (Polyvinyl Alcohol-Povidone (Op) 1 Pablo Pablo 1 Drop OPB PRN


Levalbuterol Tartrate Hfa (Levalbuterol Tartrate) 45 Mcg/Act Aer 1 Puff INH Q4H 

PRN


Premarin (Estrogens, Conjugated) 14 Appln/30 Gm Cr 1 Appln PV 2-3XWEEK


Tylenol W/Codeine #3 (Acetaminophen/Codeine Phosphate) 300 Mg/30 Mg Tab 1 Tab 

PO BID PRN


Effexor Xr (Venlafaxine Hcl) 37.5 Mg Cap 37.5 Mg PO QAM


     TAKE 150 MG + 75 MG + 37.5 MG FOR TOTAL .5 MG EACH MORNING


Levothyroxine Sodium 75 Mcg Tab 75 Mcg PO QAM


Effexor Xr (Venlafaxine Hcl) 150 Mg Cap 150 Mg PO QAM


     TAKE 150 MG + 75 MG + 37.5 MG FOR TOTAL .5 MG EACH MORNING


Lamictal (Lamotrigine) 100 Mg Tab 100 Mg PO BID


Culturelle (Lactobacillus-Inulin) 1 Cap Cap 1 Cap PO QAM


Dulera 100/5 Mcg (Mometasone Furoate-Formoterol) 1 Aer Aer 2 Puffs INH Q12


Effexor Xr (Venlafaxine Hcl) 75 Mg Cap 262.5 Mg PO QAM


     TAKE 150 MG + 75 MG + 37.5 MG FOR TOTAL .5 MG EACH MORNING


Lumigan (Bimatoprost) 0.01 % Sol 1 Drop OPB HS


Seroquel (Quetiapine Fumarate) 25 Mg Tab 25 Mg PO HS


Ferrous Sulfate 325 Mg Tab 325 Mg PO BID


Toprol Xl (Metoprolol Succinate) 25 Mg Tab 25 Mg PO HS


Flonase Allergy Relief (Fluticasone Propionate (Nasal)) 50 Mcg/Act Spr 2 Sprays 

JULIO PM PRN


Vitamin D3 (Cholecalciferol) 1,000 Unit Tab 2 Tabs PO QAM


Aspirin 81 Mg Tab 81 Mg PO HS


     PT WILL CHECK WITH SURGEON


Calcium 600 + D (Calcium Carbonate-Vitamin D) 1 Tab Tab 1 Tabs PO QAM


Astelin Nasal Spray (Azelastine Hcl) 200 Sprays/30 Ml Hume 1 Spray JULIO BID


Ativan (Lorazepam) 0.5 Mg Tab 0.25 Mg PO HS


Lipitor (Atorvastatin) 40 Mg Tab 40 Mg PO QPM


Zestril (Lisinopril) 10 Mg Tab 10 Mg PO HS


Nitrostat (Nitroglycerin) 0.4 Mg Tab 0.4 Mg UT UD PRN


     PLACE ONE TABLET UNDER THE TONGUE EVERY 5 MINUTES FOR UP TO 3 DOSES


     IF NEEDED FOR CHEST PAIN.


Neurontin (Gabapentin) 300 Mg Cap 300 Mg PO HS





Current Inpatient Medications





Current Inpatient Medications








 Medications


  (Trade)  Dose


 Ordered  Sig/Debi


 Route  Start Time


 Stop Time Status Last Admin


Dose Admin


 


 Aspirin


  (Ecotrin Tab)  81 mg  HS


 PO  4/13/18 21:00


 5/13/18 20:59   


 


 


 Atorvastatin


 Calcium


  (Lipitor Tab)  40 mg  QPM


 PO  4/13/18 21:00


 5/13/18 20:59   


 


 


 Cholecalciferol


  (Vitamin D Tab)  2,000


 inter.unit  QAM


 PO  4/14/18 09:00


 5/14/18 08:59   


 


 


 Miscellaneous


 Information


  (Order Awaiting


 Action)  1 ea  QS


 N/A  4/13/18 16:00


 5/13/18 15:59   


 


 


 Ferrous Sulfate


  (Feosol Tab)  325 mg  BID


 PO  4/13/18 21:00


 5/13/18 20:59   


 


 


 Fluticasone


 Propionate


  (Flonase Nasal


 Spray)  2 sprays  PM  PRN


 JULIO  4/13/18 11:00


 5/13/18 10:59   


 


 


 Gabapentin


  (Neurontin Cap)  300 mg  HS


 PO  4/13/18 21:00


 5/13/18 20:59   


 


 


 Lamotrigine


  (Lamictal Tab)  100 mg  BID


 PO  4/13/18 21:00


 5/13/18 20:59   


 


 


 Levalbuterol


  (Xopenex Hfa


 Inhaler)  1 puffs  Q4H  PRN


 INH  4/13/18 11:00


 5/13/18 10:59   


 


 


 Levothyroxine


 Sodium


  (Synthroid Tab)  75 mcg  DAILYBB


 PO  4/14/18 06:00


 5/14/18 05:59   


 


 


 Lorazepam


  (Ativan Tab)  0.25 mg  HS


 PO  4/13/18 21:00


 5/13/18 20:59   


 


 


 Nitroglycerin


  (Nitrostat Tab)  0.4 mg  UD  PRN


 UT  4/13/18 11:00


 5/13/18 10:59   


 


 


 Quetiapine


 Fumarate


  (seroQUEL TAB)  25 mg  HS


 PO  4/13/18 21:00


 5/13/18 20:59   


 


 


 Venlafaxine HCl


  (effeXOR


 EXTENDED REL CAP)  37.5 mg  QAM


 PO  4/14/18 09:00


 5/14/18 08:59   


 


 


 Venlafaxine HCl


  (effeXOR


 EXTENDED REL CAP)  75 mg  QAM


 PO  4/14/18 09:00


 5/14/18 08:59   


 


 


 Venlafaxine HCl


  (effeXOR


 EXTENDED REL CAP)  150 mg  QAM


 PO  4/14/18 09:00


 5/14/18 08:59   


 


 


 Bimatoprost


  (Lumigan 0.01%)  1 drops  HS


 OPB  4/13/18 21:00


 5/13/18 20:59   


 


 


 Calcium/Vitamin D


  (Caltrate Plus


 Tab)  1 tab  QAM


 PO  4/14/18 09:00


 5/14/18 08:59   


 


 


 Lactobacillus


 Acidophilus


  (Floranex Tab)  1 tab  QAM


 PO  4/14/18 09:00


 5/14/18 08:59   


 


 


 Miscellaneous


 Information


  (Order Awaiting


 Action)  1 ea  QS


 N/A  4/13/18 16:00


 5/13/18 15:59   


 


 


 Diphenhydramine


 HCl


  (Benadryl Cap)  25 mg  Q8  PRN


 PO  4/13/18 11:00


 5/13/18 10:59   


 


 


 Zolpidem Tartrate


  (Ambien Tab)  5 mg  HSZ  PRN


 PO  4/13/18 11:00


 5/13/18 10:59   


 


 


 Metoclopramide HCl


  (Reglan Inj)  10 mg  Q6H  PRN


 IV  4/13/18 11:00


 5/13/18 10:59   


 


 


 Ondansetron HCl


  (Zofran Inj)  4 mg  Q6H  PRN


 IV  4/13/18 11:00


 5/13/18 10:59   


 


 


 Pantoprazole


 Sodium


  (Protonix Tab)  40 mg  QAM


 PO  4/14/18 09:00


 4/17/18 09:01   


 


 


 Potassium


 Chloride/Dextrose/


 Sod Cl  1,000 ml @ 


 100 mls/hr  Q10H


 IV  4/13/18 12:30


 4/14/18 11:00  4/13/18 13:26


100 MLS/HR


 


 Oxycodone HCl


  (Roxicodone


 Immediate Rel Tab)  `1-2 TABS


 FOR PAIN


 `1 TAB...  Q4H  PRN


 PO  4/13/18 11:00


 4/27/18 10:59   


 


 


 Acetaminophen


  (Tylenol Tab)  1,000 mg  Q8


 PO  4/13/18 14:00


 5/13/18 13:59  4/13/18 13:27


1,000 MG


 


 Morphine Sulfate


  (MoRPHine


 SULFATE INJ)  2 mg  Q2H  PRN


 IV  4/13/18 11:00


 4/27/18 10:59   


 


 


 Naloxone HCl


  (Narcan Inj)  0.1 mg  Q2M  PRN


 IV  4/13/18 11:00


 5/13/18 10:59   


 


 


 Magnesium


 Hydroxide


  (Milk Of


 Magnesia Susp)  30 ml  Q6H  PRN


 PO  4/13/18 11:00


 5/13/18 10:59   


 


 


 Bisacodyl


  (Dulcolax Supp)  10 mg  DAILY  PRN


 PA  4/13/18 11:00


 5/13/18 10:59   


 


 


 Sodium


 Biphosphate/


 Sodium Phosphate


  (Fleet Enema)  132 ml  DAILY  PRN


 PA  4/13/18 11:00


 5/13/18 10:59   


 


 


 Docusate Sodium


  (coLACE CAP)  100 mg  BID


 PO  4/13/18 21:00


 5/13/18 20:59   


 


 


 Multivitamins


  (Multivitamin


 Tab)  1 tab  DAILY


 PO  4/14/18 09:00


 5/14/18 08:59   


 


 


 Cefazolin Sodium


 1000 mg/Syringe  7.5 ml @ 


 2.5 mls/min  Q8H


 IV  4/13/18 16:00


 4/14/18 00:02  4/13/18 16:39


2.5 MLS/MIN


 


 Metoprolol


 Succinate


  (Toprol Xl Tab)  25 mg  DAILY


 PO  4/14/18 09:00


 5/13/18 20:59   


 


 


 Mometasone


 Furoate/


 Formoterol Fumar


  (Dulera 100-5


 Mcg/Act)  2 puffs  BID


 INH  4/13/18 21:00


 5/13/18 20:59   


 


 


 Azelastine HCl


  (Astelin Nasal


 Spray)  1 sprays  BID


 NA  4/13/18 21:00


 5/13/18 20:59   


 











Review of Systems


ROS per HPI, all other systems reviewed and negative





Physical Exam











  Date Time  Temp Pulse Resp B/P (MAP) Pulse Ox O2 Delivery O2 Flow Rate FiO2


 


4/13/18 14:53 36.6 88 16 111/58 (75) 98 Nasal Cannula 2.0 


 


4/13/18 13:48  86 20 107/60 (76) 99 Nasal Cannula 2.0 


 


4/13/18 12:45  79 17 106/52 (70) 100 Nasal Cannula 2.0 


 


4/13/18 12:20 36.5 80 16 103/55 (71) 100 Nasal Cannula 2.0 


 


4/13/18 12:18      Nasal Cannula 2.0 


 


4/13/18 12:15 36.8 84 16 109/61 (77) 100 Nasal Cannula 2.0 


 


4/13/18 12:13     100 Nasal Cannula 2.0 


 


4/13/18 11:40  81 19 124/54 99 Nasal Cannula 2 


 


4/13/18 11:30 36.5 78 20 122/54 99 Nasal Cannula 2 


 


4/13/18 11:20  74 20 133/59 100 Oxymask 10 


 


4/13/18 11:10  77 21 130/50 100 Oxymask 10 


 


4/13/18 11:00  79 23 132/46 100 Oxymask 10 


 


4/13/18 10:52 36.6 69 12 127/78 100 Oxymask 10 


 


4/13/18 06:06 36.9 70 18 156/66 96 Room Air  








General Appearance:  WD/WN, no apparent distress


Head:  normocephalic, atraumatic


Eyes:  normal inspection, EOMI, sclerae normal


ENT:  hearing grossly normal, + pertinent finding (Mucous membranes dry)


Neck:  supple, no JVD, no carotid bruits


Respiratory/Chest:  lungs clear, normal breath sounds, no respiratory distress


Cardiovascular:  regular rate, rhythm, no edema, normal peripheral pulses, + 

systolic murmur (II/VI)


Abdomen/GI:  normal bowel sounds, non tender, soft, no organomegaly


Extremities/Musculoskelatal:  + pertinent finding (S/P right shoulder surgery, 

surgical dressing dry and intact, right upper extremity in sling, drain in 

place draining bloody drainage, circulation intact however sensory and movement 

still impaired from surgery)


Neurologic/Psych:  no motor/sensory deficits, alert, normal mood/affect, 

oriented x 3





Assessment & Plan


S/P RIGHT TSA


- POD#0


- activity and wound care orders as per ortho


- pain control with bowel regimen


- PT/OT


- monitor H/H for acute blood loss anemia and transfuse blood products PRN


-  cc





HISTORY OF CAD


-Stable, no reports chest pain


-Continue aspirin, statin, beta-blocker





HYPERTENSION


-Blood pressures well controlled, will continue metoprolol


-Will hold lisinopril for now until morning labs result





DEPRESSION


-Continue Effexor, Lamictal, and Seroquel





HISTORY OF CLL


-Stable, follows with [facility] oncology


-Currently on observation


-Baseline WBC around 40 K





CKD STAGE III


-Baseline creatinine runs in the low ones


-Monitor kidney functions, avoid nephrotoxic agents when able





CHRONIC ANEMIA


-Baseline hemoglobin around 11.0


-Monitor H&H


-Receives Procrit as needed





ASTHMA


-No signs of acute exacerbation


-Continue home inhalers





DVT PROPHYLAXIS


-SCDs and teds as per orthopedics





Thank you for this consultation.


We will follow the patient with you during their hospital stay.


You can reach a member of the [facility] Hospitalist Team 24/7 via pager @ [phone].





ADDENDUM:  I have seen and examined the patient and agree with the assessment 

and plan as above.  Ms. Gale denies any post-operative pain or nausea.  She 

states that she is doing well overall aside from some numbness/weakness in her 

L hand which is improving.  Otherwise physical is WNL.  Spencer, DO

## 2018-04-14 VITALS
TEMPERATURE: 98.42 F | OXYGEN SATURATION: 96 % | HEART RATE: 78 BPM | SYSTOLIC BLOOD PRESSURE: 107 MMHG | DIASTOLIC BLOOD PRESSURE: 66 MMHG

## 2018-04-14 VITALS
OXYGEN SATURATION: 95 % | TEMPERATURE: 98.06 F | DIASTOLIC BLOOD PRESSURE: 65 MMHG | HEART RATE: 77 BPM | SYSTOLIC BLOOD PRESSURE: 106 MMHG

## 2018-04-14 VITALS
DIASTOLIC BLOOD PRESSURE: 59 MMHG | OXYGEN SATURATION: 96 % | HEART RATE: 87 BPM | SYSTOLIC BLOOD PRESSURE: 103 MMHG | TEMPERATURE: 97.7 F

## 2018-04-14 VITALS
DIASTOLIC BLOOD PRESSURE: 65 MMHG | SYSTOLIC BLOOD PRESSURE: 135 MMHG | HEART RATE: 82 BPM | OXYGEN SATURATION: 95 % | TEMPERATURE: 98.24 F

## 2018-04-14 VITALS
OXYGEN SATURATION: 94 % | DIASTOLIC BLOOD PRESSURE: 67 MMHG | SYSTOLIC BLOOD PRESSURE: 117 MMHG | HEART RATE: 82 BPM | TEMPERATURE: 98.24 F

## 2018-04-14 LAB
BUN SERPL-MCNC: 18 MG/DL (ref 7–18)
CALCIUM SERPL-MCNC: 8.2 MG/DL (ref 8.5–10.1)
CO2 SERPL-SCNC: 24 MMOL/L (ref 21–32)
CREAT SERPL-MCNC: 1.1 MG/DL (ref 0.6–1.2)
EOSINOPHIL NFR BLD AUTO: 175 K/UL (ref 130–400)
GLUCOSE SERPL-MCNC: 130 MG/DL (ref 70–99)
HCT VFR BLD CALC: 30.3 % (ref 37–47)
HGB BLD-MCNC: 9.4 G/DL (ref 12–16)
MCH RBC QN AUTO: 30.2 PG (ref 25–34)
MCHC RBC AUTO-ENTMCNC: 31 G/DL (ref 32–36)
MCV RBC AUTO: 97.4 FL (ref 80–100)
PMV BLD AUTO: 9.1 FL (ref 7.4–10.4)
POTASSIUM SERPL-SCNC: 4.8 MMOL/L (ref 3.5–5.1)
RED CELL DISTRIBUTION WIDTH CV: 15.3 % (ref 11.5–14.5)
RED CELL DISTRIBUTION WIDTH SD: 53.9 FL (ref 36.4–46.3)
SODIUM SERPL-SCNC: 137 MMOL/L (ref 136–145)
WBC # BLD AUTO: 34.58 K/UL (ref 4.8–10.8)

## 2018-04-14 RX ADMIN — VENLAFAXINE HYDROCHLORIDE SCH MG: 75 CAPSULE, EXTENDED RELEASE ORAL at 08:56

## 2018-04-14 RX ADMIN — OXYCODONE HYDROCHLORIDE PRN MG: 5 TABLET ORAL at 16:31

## 2018-04-14 RX ADMIN — VENLAFAXINE HYDROCHLORIDE SCH MG: 37.5 CAPSULE, EXTENDED RELEASE ORAL at 08:57

## 2018-04-14 RX ADMIN — DEXTROSE MONOHYDRATE, SODIUM CHLORIDE, AND POTASSIUM CHLORIDE SCH MLS/HR: 50; 4.5; 1.49 INJECTION, SOLUTION INTRAVENOUS at 08:53

## 2018-04-14 RX ADMIN — BIMATOPROST SCH DROPS: 0.1 SOLUTION/ DROPS OPHTHALMIC at 21:22

## 2018-04-14 RX ADMIN — ACETAMINOPHEN SCH MG: 500 TABLET, COATED ORAL at 21:29

## 2018-04-14 RX ADMIN — Medication SCH INTER.UNIT: at 08:58

## 2018-04-14 RX ADMIN — GABAPENTIN SCH MG: 300 CAPSULE ORAL at 21:24

## 2018-04-14 RX ADMIN — ALUMINUM ZIRCONIUM TRICHLOROHYDREX GLY SCH EA: 0.2 STICK TOPICAL at 16:00

## 2018-04-14 RX ADMIN — MORPHINE SULFATE PRN MG: 2 INJECTION, SOLUTION INTRAMUSCULAR; INTRAVENOUS at 07:27

## 2018-04-14 RX ADMIN — LACTOBACILLUS TAB SCH TAB: TAB at 08:56

## 2018-04-14 RX ADMIN — VENLAFAXINE HYDROCHLORIDE SCH MG: 150 CAPSULE, EXTENDED RELEASE ORAL at 08:56

## 2018-04-14 RX ADMIN — MORPHINE SULFATE PRN MG: 2 INJECTION, SOLUTION INTRAMUSCULAR; INTRAVENOUS at 13:53

## 2018-04-14 RX ADMIN — QUETIAPINE FUMARATE SCH MG: 25 TABLET, FILM COATED ORAL at 00:02

## 2018-04-14 RX ADMIN — ACETAMINOPHEN SCH MG: 500 TABLET, COATED ORAL at 13:53

## 2018-04-14 RX ADMIN — FERROUS SULFATE TAB EC 325 MG (65 MG FE EQUIVALENT) SCH MG: 325 (65 FE) TABLET DELAYED RESPONSE at 21:24

## 2018-04-14 RX ADMIN — Medication SCH TAB: at 08:57

## 2018-04-14 RX ADMIN — FERROUS SULFATE TAB EC 325 MG (65 MG FE EQUIVALENT) SCH MG: 325 (65 FE) TABLET DELAYED RESPONSE at 08:57

## 2018-04-14 RX ADMIN — DOCUSATE SODIUM SCH MG: 100 CAPSULE, LIQUID FILLED ORAL at 08:57

## 2018-04-14 RX ADMIN — PANTOPRAZOLE SCH MG: 40 TABLET, DELAYED RELEASE ORAL at 08:56

## 2018-04-14 RX ADMIN — OXYCODONE HYDROCHLORIDE PRN MG: 5 TABLET ORAL at 05:15

## 2018-04-14 RX ADMIN — Medication SCH MG: at 21:25

## 2018-04-14 RX ADMIN — LEVOTHYROXINE SODIUM SCH MCG: 75 TABLET ORAL at 05:52

## 2018-04-14 RX ADMIN — MORPHINE SULFATE PRN MG: 2 INJECTION, SOLUTION INTRAMUSCULAR; INTRAVENOUS at 11:44

## 2018-04-14 RX ADMIN — DOCUSATE SODIUM SCH MG: 100 CAPSULE, LIQUID FILLED ORAL at 21:24

## 2018-04-14 RX ADMIN — MORPHINE SULFATE PRN MG: 2 INJECTION, SOLUTION INTRAMUSCULAR; INTRAVENOUS at 18:44

## 2018-04-14 RX ADMIN — QUETIAPINE FUMARATE SCH MG: 25 TABLET, FILM COATED ORAL at 22:26

## 2018-04-14 RX ADMIN — MORPHINE SULFATE PRN MG: 2 INJECTION, SOLUTION INTRAMUSCULAR; INTRAVENOUS at 21:26

## 2018-04-14 RX ADMIN — LORAZEPAM SCH MG: 0.5 TABLET ORAL at 22:26

## 2018-04-14 RX ADMIN — METOPROLOL SUCCINATE SCH MG: 25 TABLET, EXTENDED RELEASE ORAL at 08:57

## 2018-04-14 RX ADMIN — ALUMINUM ZIRCONIUM TRICHLOROHYDREX GLY SCH EA: 0.2 STICK TOPICAL at 00:00

## 2018-04-14 RX ADMIN — ACETAMINOPHEN SCH MG: 500 TABLET, COATED ORAL at 05:52

## 2018-04-14 RX ADMIN — ATORVASTATIN CALCIUM SCH MG: 40 TABLET, FILM COATED ORAL at 21:25

## 2018-04-14 RX ADMIN — LORAZEPAM SCH MG: 0.5 TABLET ORAL at 00:02

## 2018-04-14 RX ADMIN — ALUMINUM ZIRCONIUM TRICHLOROHYDREX GLY SCH EA: 0.2 STICK TOPICAL at 07:32

## 2018-04-14 RX ADMIN — CEFAZOLIN SCH MLS/MIN: 10 INJECTION, POWDER, FOR SOLUTION INTRAVENOUS at 00:02

## 2018-04-14 NOTE — PROGRESS NOTE
Internal Med Progress Note


Date of Service:


Apr 14, 2018.


Provider Documentation:





SUBJECTIVE:





complains of lot of pain at surgery site


afebrile


eating ok


no sob or chest pain


no nausea


likes pain to be better before discharge








OBJECTIVE:





Vital Signs-as noted below





Exam:


General-alert and oriented. Not in distress


ENT-normal hearing.


Neck-No neck masses


Lungs-CTA b/l no wheezing or crackles


Heart-S1 and S2 heard regular rhythm no murmurs


Abdomen-soft Bowels sounds present no  tenderness present  no distension 


Extremities-right shoulder in sling s/p rt shoulder surgery-dressing intact


Neuro-alert and oriented


moves extremities





Lab data as noted below.


ASSESSMENT & PLAN:


S/P RIGHT TSA


 POD#1


management as per ortho





HISTORY OF CAD


stable


On  aspirin, statin, beta-blocker





HYPERTENSION


on metoprolol


 lisinopril on hold  for now.


will monitor





DEPRESSION


on  Effexor, Lamictal, and Seroquel





HISTORY OF CLL


Stable, follows with [facility] oncology


Baseline WBC around 40 K





CKD STAGE III


Baseline creatinine runs in the low ones


will f/.u labs





CHRONIC ANEMIA


Baseline hemoglobin around 11.0


Receives Procrit as needed


will f/u labs





ASTHMA


stable on  home inhalers





DVT PROPHYLAXIS


SCDs and teds as per orthopedics








DISPOSITION


as per ortho


Vital Signs:











  Date Time  Temp Pulse Resp B/P (MAP) Pulse Ox O2 Delivery O2 Flow Rate FiO2


 


4/14/18 15:18 36.7 77 16 106/65 (79) 95 Room Air  


 


4/14/18 11:30 36.9 78 16 107/66 (80) 96 Room Air  


 


4/14/18 07:50 36.8 82 16 135/65 (88) 95 Room Air  


 


4/14/18 07:20      Room Air  


 


4/14/18 03:45 36.5 87 18 103/59 (74) 96 Room Air  


 


4/13/18 23:50     97 Room Air 2.0 


 


4/13/18 23:04 36.7 93 17 113/54 (73) 97 Room Air  


 


4/13/18 20:00  91  117/75 (89)    


 


4/13/18 19:48    89/52 (64)    


 


4/13/18 19:23 36.8 96 16 93/56 (68) 96 Nasal Cannula 2.0 


 


4/13/18 16:30     98 Nasal Cannula 2.0 








Lab Results:





Results Past 24 Hours








Test


  4/14/18


05:55 Range/Units


 


 


White Blood Count 34.58 4.8-10.8  K/uL


 


Red Blood Count 3.11 4.2-5.4  M/uL


 


Hemoglobin 9.4 12.0-16.0  g/dL


 


Hematocrit 30.3 37-47  %


 


Mean Corpuscular Volume 97.4   fL


 


Mean Corpuscular Hemoglobin 30.2 25-34  pg


 


Mean Corpuscular Hemoglobin


Concent 31.0


  32-36  g/dl


 


 


RDW Standard Deviation 53.9 36.4-46.3  fL


 


RDW Coefficient of Variation 15.3 11.5-14.5  %


 


Platelet Count 175 130-400  K/uL


 


Mean Platelet Volume 9.1 7.4-10.4  fL


 


Sodium Level 137 136-145  mmol/L


 


Potassium Level 4.8 3.5-5.1  mmol/L


 


Chloride Level 108   mmol/L


 


Carbon Dioxide Level 24 21-32  mmol/L


 


Anion Gap 5.0 3-11  mmol/L


 


Blood Urea Nitrogen 18 7-18  mg/dl


 


Creatinine


  1.10


  0.60-1.20


mg/dl


 


Est Creatinine Clear Calc


Drug Dose 29.4


   ml/min


 


 


Estimated GFR (


American) 52.6


   


 


 


Estimated GFR (Non-


American 45.4


   


 


 


BUN/Creatinine Ratio 16.2 10-20  


 


Random Glucose 130 70-99  mg/dl


 


Calcium Level 8.2 8.5-10.1  mg/dl

## 2018-04-14 NOTE — ORTHOPEDIC PROGRESS NOTE
Orthopedic Progress Note


Date of Service


Apr 14, 2018.





Subjective


Post OP Day:  1


Reports: feeling well, pain controlled w PO medications, Denies: complaints, 

chest pain, SOB, nausea / vomiting, light headedness, calf pain





Objective


N/V intact, capillary refill less than 2 sec., dressing C/D/I, A&O x3











  Date Time  Temp Pulse Resp B/P (MAP) Pulse Ox O2 Delivery O2 Flow Rate FiO2


 


4/14/18 03:45 36.5 87 18 103/59 (74) 96 Room Air  


 


4/13/18 23:50     97 Room Air 2.0 


 


4/13/18 23:04 36.7 93 17 113/54 (73) 97 Room Air  


 


4/13/18 20:00  91  117/75 (89)    


 


4/13/18 19:48    89/52 (64)    


 


4/13/18 19:23 36.8 96 16 93/56 (68) 96 Nasal Cannula 2.0 


 


4/13/18 16:30     98 Nasal Cannula 2.0 


 


4/13/18 14:53 36.6 88 16 111/58 (75) 98 Nasal Cannula 2.0 


 


4/13/18 13:48  86 20 107/60 (76) 99 Nasal Cannula 2.0 


 


4/13/18 12:45  79 17 106/52 (70) 100 Nasal Cannula 2.0 


 


4/13/18 12:20 36.5 80 16 103/55 (71) 100 Nasal Cannula 2.0 


 


4/13/18 12:18      Nasal Cannula 2.0 


 


4/13/18 12:15 36.8 84 16 109/61 (77) 100 Nasal Cannula 2.0 


 


4/13/18 12:13     100 Nasal Cannula 2.0 


 


4/13/18 11:40  81 19 124/54 99 Nasal Cannula 2 


 


4/13/18 11:30 36.5 78 20 122/54 99 Nasal Cannula 2 


 


4/13/18 11:20  74 20 133/59 100 Oxymask 10 


 


4/13/18 11:10  77 21 130/50 100 Oxymask 10 


 


4/13/18 11:00  79 23 132/46 100 Oxymask 10 


 


4/13/18 10:52 36.6 69 12 127/78 100 Oxymask 10 








Laboratory Results 24 Hours:











Test


  4/14/18


05:55


 


Hematocrit 30.3 % 


 


Hemoglobin 9.4 g/dL 











Assessment & Plan


Assessment:


POD #1 s/p Right shoulder reversed total shoulder arthroplasty, repair,


greater tuberosity fracture, and biceps tenodesis.


   pt/ot


   pain control


   unsure if she wants to do OPPT or HHPT, will discuss w her family


   plan to keep her today for better pain control

## 2018-04-15 VITALS
OXYGEN SATURATION: 97 % | TEMPERATURE: 97.88 F | DIASTOLIC BLOOD PRESSURE: 59 MMHG | HEART RATE: 74 BPM | SYSTOLIC BLOOD PRESSURE: 140 MMHG

## 2018-04-15 VITALS
OXYGEN SATURATION: 97 % | TEMPERATURE: 98.42 F | SYSTOLIC BLOOD PRESSURE: 112 MMHG | HEART RATE: 75 BPM | DIASTOLIC BLOOD PRESSURE: 69 MMHG

## 2018-04-15 VITALS
TEMPERATURE: 98.24 F | HEART RATE: 79 BPM | OXYGEN SATURATION: 95 % | DIASTOLIC BLOOD PRESSURE: 68 MMHG | SYSTOLIC BLOOD PRESSURE: 128 MMHG

## 2018-04-15 LAB
BUN SERPL-MCNC: 15 MG/DL (ref 7–18)
CALCIUM SERPL-MCNC: 7.9 MG/DL (ref 8.5–10.1)
CO2 SERPL-SCNC: 25 MMOL/L (ref 21–32)
CREAT SERPL-MCNC: 0.9 MG/DL (ref 0.6–1.2)
EOSINOPHIL NFR BLD AUTO: 144 K/UL (ref 130–400)
GLUCOSE SERPL-MCNC: 82 MG/DL (ref 70–99)
HCT VFR BLD CALC: 26.5 % (ref 37–47)
HGB BLD-MCNC: 8.3 G/DL (ref 12–16)
MCH RBC QN AUTO: 30.5 PG (ref 25–34)
MCHC RBC AUTO-ENTMCNC: 31.3 G/DL (ref 32–36)
MCV RBC AUTO: 97.4 FL (ref 80–100)
PMV BLD AUTO: 9.2 FL (ref 7.4–10.4)
POTASSIUM SERPL-SCNC: 4.7 MMOL/L (ref 3.5–5.1)
RED CELL DISTRIBUTION WIDTH CV: 15.5 % (ref 11.5–14.5)
RED CELL DISTRIBUTION WIDTH SD: 54.7 FL (ref 36.4–46.3)
SODIUM SERPL-SCNC: 134 MMOL/L (ref 136–145)
WBC # BLD AUTO: 27.54 K/UL (ref 4.8–10.8)

## 2018-04-15 RX ADMIN — LEVOTHYROXINE SODIUM SCH MCG: 75 TABLET ORAL at 05:44

## 2018-04-15 RX ADMIN — Medication SCH MG: at 21:51

## 2018-04-15 RX ADMIN — VENLAFAXINE HYDROCHLORIDE SCH MG: 37.5 CAPSULE, EXTENDED RELEASE ORAL at 08:31

## 2018-04-15 RX ADMIN — DOCUSATE SODIUM SCH MG: 100 CAPSULE, LIQUID FILLED ORAL at 21:51

## 2018-04-15 RX ADMIN — ALUMINUM ZIRCONIUM TRICHLOROHYDREX GLY SCH EA: 0.2 STICK TOPICAL at 16:00

## 2018-04-15 RX ADMIN — QUETIAPINE FUMARATE SCH MG: 25 TABLET, FILM COATED ORAL at 23:53

## 2018-04-15 RX ADMIN — Medication SCH TAB: at 08:29

## 2018-04-15 RX ADMIN — ALUMINUM ZIRCONIUM TRICHLOROHYDREX GLY SCH EA: 0.2 STICK TOPICAL at 23:53

## 2018-04-15 RX ADMIN — VENLAFAXINE HYDROCHLORIDE SCH MG: 150 CAPSULE, EXTENDED RELEASE ORAL at 08:29

## 2018-04-15 RX ADMIN — ATORVASTATIN CALCIUM SCH MG: 40 TABLET, FILM COATED ORAL at 21:50

## 2018-04-15 RX ADMIN — ACETAMINOPHEN SCH MG: 500 TABLET, COATED ORAL at 21:50

## 2018-04-15 RX ADMIN — ALUMINUM ZIRCONIUM TRICHLOROHYDREX GLY SCH EA: 0.2 STICK TOPICAL at 08:00

## 2018-04-15 RX ADMIN — ACETAMINOPHEN SCH MG: 500 TABLET, COATED ORAL at 05:45

## 2018-04-15 RX ADMIN — OXYCODONE HYDROCHLORIDE SCH MG: 10 TABLET, FILM COATED, EXTENDED RELEASE ORAL at 10:53

## 2018-04-15 RX ADMIN — ACETAMINOPHEN SCH MG: 500 TABLET, COATED ORAL at 13:59

## 2018-04-15 RX ADMIN — PANTOPRAZOLE SCH MG: 40 TABLET, DELAYED RELEASE ORAL at 08:31

## 2018-04-15 RX ADMIN — VENLAFAXINE HYDROCHLORIDE SCH MG: 75 CAPSULE, EXTENDED RELEASE ORAL at 08:29

## 2018-04-15 RX ADMIN — LACTOBACILLUS TAB SCH TAB: TAB at 08:29

## 2018-04-15 RX ADMIN — ALUMINUM ZIRCONIUM TRICHLOROHYDREX GLY SCH EA: 0.2 STICK TOPICAL at 03:50

## 2018-04-15 RX ADMIN — Medication SCH INTER.UNIT: at 08:30

## 2018-04-15 RX ADMIN — ALUMINUM ZIRCONIUM TRICHLOROHYDREX GLY SCH EA: 0.2 STICK TOPICAL at 01:27

## 2018-04-15 RX ADMIN — METOPROLOL SUCCINATE SCH MG: 25 TABLET, EXTENDED RELEASE ORAL at 08:29

## 2018-04-15 RX ADMIN — GABAPENTIN SCH MG: 300 CAPSULE ORAL at 21:50

## 2018-04-15 RX ADMIN — DOCUSATE SODIUM SCH MG: 100 CAPSULE, LIQUID FILLED ORAL at 08:31

## 2018-04-15 RX ADMIN — MORPHINE SULFATE PRN MG: 2 INJECTION, SOLUTION INTRAMUSCULAR; INTRAVENOUS at 17:00

## 2018-04-15 RX ADMIN — LORAZEPAM SCH MG: 0.5 TABLET ORAL at 23:53

## 2018-04-15 RX ADMIN — MORPHINE SULFATE PRN MG: 2 INJECTION, SOLUTION INTRAMUSCULAR; INTRAVENOUS at 21:48

## 2018-04-15 RX ADMIN — Medication SCH TAB: at 08:31

## 2018-04-15 RX ADMIN — OXYCODONE HYDROCHLORIDE SCH MG: 10 TABLET, FILM COATED, EXTENDED RELEASE ORAL at 01:29

## 2018-04-15 RX ADMIN — BIMATOPROST SCH DROPS: 0.1 SOLUTION/ DROPS OPHTHALMIC at 21:47

## 2018-04-15 RX ADMIN — OXYCODONE HYDROCHLORIDE SCH MG: 10 TABLET, FILM COATED, EXTENDED RELEASE ORAL at 21:48

## 2018-04-15 RX ADMIN — FERROUS SULFATE TAB EC 325 MG (65 MG FE EQUIVALENT) SCH MG: 325 (65 FE) TABLET DELAYED RESPONSE at 08:31

## 2018-04-15 RX ADMIN — FERROUS SULFATE TAB EC 325 MG (65 MG FE EQUIVALENT) SCH MG: 325 (65 FE) TABLET DELAYED RESPONSE at 21:50

## 2018-04-15 NOTE — PROGRESS NOTE
Internal Med Progress Note


Date of Service:


Apr 15, 2018.


Provider Documentation:





SUBJECTIVE:





right shoulder pain much better than yesterday


no chest pain or sob


afebrile


no cough


no nausea


 in room


OBJECTIVE:





Vital Signs-as noted below





Exam:


General-alert and oriented. Not in distress


ENT-normal hearing.


Neck-No neck masses


Lungs-CTA b/l no wheezing or crackles


Heart-S1 and S2 heard regular rhythm no murmurs


Abdomen-soft Bowels sounds present no  tenderness present  no distension 


Extremities-right shoulder in sling s/p rt shoulder surgery-dressing intact


Neuro-alert and oriented


moves extremities





Lab data as noted below.


ASSESSMENT & PLAN:


S/P RIGHT TSA


 POD#2


pain improving


management as per ortho





HISTORY OF CAD


stable


On  aspirin, statin, beta-blocker





HYPERTENSION


on metoprolol


 lisinopril on hold  for now.


BP ok


will monitor





DEPRESSION


on  Effexor, Lamictal, and Seroquel





HISTORY OF CLL


Stable, follows with [facility] oncology


Baseline WBC around 40 K





CKD STAGE III


Baseline creatinine runs in the low ones


cr 0.9 today


will f/.u labs





Acute on CHRONIC ANEMIA


from blood loss?


hb 8.3 today


Baseline hemoglobin around 11.0


Receives Procrit as needed


will f/u labs





Hyponatremia


na 134 today


f./u labs in am





ASTHMA


stable on  home inhalers





DVT PROPHYLAXIS


SCDs and teds as per orthopedics








DISPOSITION


as per ortho


Vital Signs:











  Date Time  Temp Pulse Resp B/P (MAP) Pulse Ox O2 Delivery O2 Flow Rate FiO2


 


4/15/18 16:15      Room Air  


 


4/15/18 15:38 36.6 74 16 140/59 (86) 97 Room Air  


 


4/15/18 08:30      Room Air  


 


4/15/18 07:35 36.9 75 16 112/69 (83) 97 Room Air  


 


4/14/18 23:11      Room Air  


 


4/14/18 23:05 36.8 82 16 117/67 (84) 94 Room Air  








Lab Results:





Results Past 24 Hours








Test


  4/15/18


05:20 Range/Units


 


 


White Blood Count 27.54 4.8-10.8  K/uL


 


Red Blood Count 2.72 4.2-5.4  M/uL


 


Hemoglobin 8.3 12.0-16.0  g/dL


 


Hematocrit 26.5 37-47  %


 


Mean Corpuscular Volume 97.4   fL


 


Mean Corpuscular Hemoglobin 30.5 25-34  pg


 


Mean Corpuscular Hemoglobin


Concent 31.3


  32-36  g/dl


 


 


RDW Standard Deviation 54.7 36.4-46.3  fL


 


RDW Coefficient of Variation 15.5 11.5-14.5  %


 


Platelet Count 144 130-400  K/uL


 


Mean Platelet Volume 9.2 7.4-10.4  fL


 


Sodium Level 134 136-145  mmol/L


 


Potassium Level 4.7 3.5-5.1  mmol/L


 


Chloride Level 104   mmol/L


 


Carbon Dioxide Level 25 21-32  mmol/L


 


Anion Gap 5.0 3-11  mmol/L


 


Blood Urea Nitrogen 15 7-18  mg/dl


 


Creatinine


  0.90


  0.60-1.20


mg/dl


 


Est Creatinine Clear Calc


Drug Dose 35.9


   ml/min


 


 


Estimated GFR (


American) 67.1


   


 


 


Estimated GFR (Non-


American 57.9


   


 


 


BUN/Creatinine Ratio 16.4 10-20  


 


Random Glucose 82 70-99  mg/dl


 


Calcium Level 7.9 8.5-10.1  mg/dl

## 2018-04-15 NOTE — ORTHOPEDIC PROGRESS NOTE
Orthopedic Progress Note


Date of Service


Apr 15, 2018.





Subjective


Post OP Day:  2


Reports: feeling well, Denies: chest pain, SOB, nausea / vomiting, light 

headedness


Additional Notes:


moderate intermittent pain





Objective


N/V intact, dressing C/D/I, incision C/D/I, A&O x3











  Date Time  Temp Pulse Resp B/P (MAP) Pulse Ox O2 Delivery O2 Flow Rate FiO2


 


4/14/18 23:11      Room Air  


 


4/14/18 23:05 36.8 82 16 117/67 (84) 94 Room Air  


 


4/14/18 16:15      Room Air  


 


4/14/18 15:18 36.7 77 16 106/65 (79) 95 Room Air  


 


4/14/18 11:30 36.9 78 16 107/66 (80) 96 Room Air  


 


4/14/18 07:50 36.8 82 16 135/65 (88) 95 Room Air  


 


4/14/18 07:20      Room Air  








Laboratory Results 24 Hours:











Test


  4/15/18


05:20


 


Hematocrit 26.5 % 


 


Hemoglobin 8.3 g/dL 











Assessment & Plan


Assessment:


POD #2 s/p Right shoulder reversed total shoulder arthroplasty, repair,


greater tuberosity fracture, and biceps tenodesis.


   H/H- 8.3/26.5


   pt/ot


   pain control- increased pain last evening, is hesitant to be d/c due to pain 

level, will see if pain improves throughout the day


   would like to be d/c with HHPT when stable

## 2018-04-16 VITALS
OXYGEN SATURATION: 95 % | DIASTOLIC BLOOD PRESSURE: 77 MMHG | SYSTOLIC BLOOD PRESSURE: 133 MMHG | TEMPERATURE: 98.24 F | HEART RATE: 77 BPM

## 2018-04-16 VITALS
DIASTOLIC BLOOD PRESSURE: 77 MMHG | HEART RATE: 77 BPM | OXYGEN SATURATION: 95 % | SYSTOLIC BLOOD PRESSURE: 133 MMHG | TEMPERATURE: 98.24 F

## 2018-04-16 LAB
EOSINOPHIL NFR BLD AUTO: 210 K/UL (ref 130–400)
HCT VFR BLD CALC: 31.2 % (ref 37–47)
HGB BLD-MCNC: 9.9 G/DL (ref 12–16)
MCH RBC QN AUTO: 30.8 PG (ref 25–34)
MCHC RBC AUTO-ENTMCNC: 31.7 G/DL (ref 32–36)
MCV RBC AUTO: 97.2 FL (ref 80–100)
PMV BLD AUTO: 9.3 FL (ref 7.4–10.4)
RED CELL DISTRIBUTION WIDTH CV: 15 % (ref 11.5–14.5)
RED CELL DISTRIBUTION WIDTH SD: 53.2 FL (ref 36.4–46.3)
WBC # BLD AUTO: 35.52 K/UL (ref 4.8–10.8)

## 2018-04-16 RX ADMIN — METOPROLOL SUCCINATE SCH MG: 25 TABLET, EXTENDED RELEASE ORAL at 08:21

## 2018-04-16 RX ADMIN — Medication SCH TAB: at 08:20

## 2018-04-16 RX ADMIN — OXYCODONE HYDROCHLORIDE SCH MG: 10 TABLET, FILM COATED, EXTENDED RELEASE ORAL at 08:19

## 2018-04-16 RX ADMIN — FERROUS SULFATE TAB EC 325 MG (65 MG FE EQUIVALENT) SCH MG: 325 (65 FE) TABLET DELAYED RESPONSE at 08:19

## 2018-04-16 RX ADMIN — ACETAMINOPHEN SCH MG: 500 TABLET, COATED ORAL at 06:03

## 2018-04-16 RX ADMIN — LACTOBACILLUS TAB SCH TAB: TAB at 08:22

## 2018-04-16 RX ADMIN — ACETAMINOPHEN SCH MG: 500 TABLET, COATED ORAL at 13:01

## 2018-04-16 RX ADMIN — VENLAFAXINE HYDROCHLORIDE SCH MG: 75 CAPSULE, EXTENDED RELEASE ORAL at 08:19

## 2018-04-16 RX ADMIN — VENLAFAXINE HYDROCHLORIDE SCH MG: 37.5 CAPSULE, EXTENDED RELEASE ORAL at 08:22

## 2018-04-16 RX ADMIN — LEVOTHYROXINE SODIUM SCH MCG: 75 TABLET ORAL at 06:03

## 2018-04-16 RX ADMIN — DOCUSATE SODIUM SCH MG: 100 CAPSULE, LIQUID FILLED ORAL at 08:19

## 2018-04-16 RX ADMIN — Medication SCH TAB: at 08:22

## 2018-04-16 RX ADMIN — Medication SCH INTER.UNIT: at 08:20

## 2018-04-16 RX ADMIN — OXYCODONE HYDROCHLORIDE PRN MG: 5 TABLET ORAL at 12:59

## 2018-04-16 RX ADMIN — VENLAFAXINE HYDROCHLORIDE SCH MG: 150 CAPSULE, EXTENDED RELEASE ORAL at 08:19

## 2018-04-16 RX ADMIN — ALUMINUM ZIRCONIUM TRICHLOROHYDREX GLY SCH EA: 0.2 STICK TOPICAL at 08:00

## 2018-04-16 RX ADMIN — PANTOPRAZOLE SCH MG: 40 TABLET, DELAYED RELEASE ORAL at 08:20

## 2018-04-16 NOTE — ORTHOPEDIC PROGRESS NOTE
Orthopedic Progress Note


Date of Service


Apr 16, 2018.





Subjective


Post OP Day:  3


Reports: feeling well, pain controlled w PO medications, Denies: complaints, SOB

, nausea / vomiting, light headedness





Objective


N/V intact, capillary refill less than 2 sec., incision C/D/I, A&O x3











  Date Time  Temp Pulse Resp B/P (MAP) Pulse Ox O2 Delivery O2 Flow Rate FiO2


 


4/16/18 07:15      Room Air  


 


4/16/18 07:04 36.8 77 17 133/77 (95) 95 Room Air  


 


4/15/18 23:50      Room Air  


 


4/15/18 23:02 36.8 79 20 128/68 (88) 95 Room Air  


 


4/15/18 16:15      Room Air  


 


4/15/18 15:38 36.6 74 16 140/59 (86) 97 Room Air  











Assessment & Plan


Assessment:


POD #3 s/p Right shoulder reversed total shoulder arthroplasty, repair,


greater tuberosity fracture, and biceps tenodesis.


   H/H- 8.3/26.5


   pt/ot


   pain control- states doing well on current regimine and would like to go 

home on current pain meds.


D/C home today w HH if Hgb stable

## 2018-04-16 NOTE — DISCHARGE INSTRUCTIONS
Discharge Instructions


Date of Service


2018.





Admission


Reason for Admission:  Right Humerus Displaced Greater Tuberosity Fractur





Discharge


Discharge Diagnosis / Problem:  Right reversed TSA, biceps tenodesis, repair 

tuberosity





Discharge Goals


Goal(s):  Improve function





Activity Recommendations


Activity Limitations:  as noted below





.





Instructions / Follow-Up


Instructions / Follow-Up





ACTIVITY RECOMMENDATIONS:





SELF CARE INSTRUCTIONS AFTER TOTAL SHOULDER ARTHROPLASTY REVERSE





A.  You may do daily exercises as taught in physical therapy while in hospital.

  No


      lifting with the operative arm. 





B.  You are to wear your sling/immobilizer at all times EXCEPT when performing 

your


      daily exercises and for hygiene purposes.





C.  You may perform dry, daily dressing changes.  Please keep your incision 

covered.


      You may shower 48 hours after surgery.  Do not apply soap or any ointment/

lotions


      directly over incision.  Do not soak incision in bath tub/swimming pool.





D.  You may use ice as needed to operative shoulder.





           


SPECIAL CARE INSTRUCTIONS:





**VERY IMPORTANT TO READ AND REVIEW**





A.  There are a few signs you need to watch for after you are home.


     Call [facility] at [phone] if you experience


     any of the followin.  Increased severe shoulder pain.  Some pain is expected especially 


       when you exercise.


   2.  Increased swelling in you shoulder or arm; pain or swelling in 


       either upper extremity.


   3.  Any fluid drainage from the incision.


   4.  Shortness of breath or chest pain.





B.  Please call [facility] at [phone] if you have


     any questions or concerns about your operation or recovery.  





C.  Call your physician if:





   1.  Temperature is greater than 101 degrees (F).


   2.  Pain is not relieved by prescribed pain medications.


   3.  Increase drainage or redness from incision.


   4.  Unanswered questions or concerns.








FOLLOW UP VISIT:





Please call [facility] at [phone] to schedule a follow 

up


appointment with Dr. Teixeira or his PA in 12-14 days from your surgery date.








Current Hospital Diet


Patient's current hospital diet: Regular Diet





Discharge Diet


Recommended Diet:  Regular Diet





Procedures


Procedures Performed:  


Right Reserve Total Shoulder Arthroplasty, Repair of Greater Tuberosity 

Fracture 


Right Shoulder, Right Shoulder Biceps Tendonesis





Pending Studies


Studies pending at discharge:  no





Medical Emergencies








.


Who to Call and When:





Medical Emergencies:  If at any time you feel your situation is an emergency, 

please call [phone] immediately.





.





Non-Emergent Contact


Non-Emergency issues call your:  Primary Care Provider


.








"Provider Documentation" section prepared by Juan Antonio Freeman.








.





[place] Drug Monitoring Program


Search Results:  patient reviewed within database, no issues identified

## 2018-04-16 NOTE — ANESTHESIOLOGY PROGRESS NOTE
Anesthesia Post Op Note


Date & Time


Apr 16, 2018 at 09:38





Vital Signs


Pain Intensity:  0.0





Vital Signs Past 12 Hours








  Date Time  Temp Pulse Resp B/P (MAP) Pulse Ox O2 Delivery O2 Flow Rate FiO2


 


4/16/18 07:15      Room Air  


 


4/16/18 07:04 36.8 77 17 133/77 (95) 95 Room Air  


 


4/15/18 23:50      Room Air  


 


4/15/18 23:02 36.8 79 20 128/68 (88) 95 Room Air  











Notes


Mental Status:  alert / awake / arousable, participated in evaluation


Pt Amnestic to Procedure:  Yes


Nausea / Vomiting:  adequately controlled


Pain:  adequately controlled


Airway Patency, RR, SpO2:  stable & adequate


BP & HR:  stable & adequate


Hydration State:  stable & adequate


Anesthetic Complications:  no major complications apparent

## 2019-09-20 NOTE — DIAGNOSTIC IMAGING REPORT
CHEST ONE VIEW PORTABLE



CLINICAL HISTORY: Pain, radiating to the abdomen    



COMPARISON STUDY:  7/4/2016



FINDINGS: The cardiac and sternal contours remain stable. There is mild

elevation/eventration right hemidiaphragm unchanged the prior study. There is no

focal pulmonary consolidation. There is no failure. There are no pleural

effusions. There is minor thickening of interstitial markings unchanged the

prior study. There is no free intraperitoneal air.[ 



IMPRESSION: No active disease in the chest.







Electronically signed by:  Joshua Medina M.D.

10/10/2017 8:08 PM



Dictated Date/Time:  10/10/2017 8:07 PM If you are a smoker, it is important for your health to stop smoking. Please be aware that second hand smoke is also harmful.

## 2021-06-10 NOTE — EMERGENCY ROOM VISIT NOTE
History


Report prepared by Xuan:  Boogie Grullon


Under the Supervision of:  Dr. Lucas Ryan D.O.


First contact with patient:  17:26


Chief Complaint:  GI ASSESSMENT


Stated Complaint:  DARK STOOLS- REFERRED


Nursing Triage Summary:  


Pt referred by PCP. recently discharged from [facility] for abd pain/back pain and UTI. 


E coli in urine per pt. Pt now reports loose, black, tarry stools last night 

and 


today. c/o bad discomfort. + nausea, denies vomiting





History of Present Illness


The patient is a 86 year old female who presents to the Emergency Room with 

complaints of persistent melena starting last night. The patient states that 

she was recently admitted in the hospital for two days and was discharged last 

night with Keflex for a UTI after getting IV antibiotics. She states that she 

saw her PCP today, and they told her to come to the ED for dark tarry stool. 

She states that she is not taking Pepto-Bismol, and she is currently taking 

iron supplements. The patient additionally notes that she has a history of C 

Diff. She additionally is complaining of nausea and abdominal discomfort.





   Source of History:  patient


   Onset:  last night


   Position:  other (global)


   Quality:  other (melena)


   Timing:  other (persistent)


   Associated Symptoms:  + nausea





Review of Systems


See HPI for pertinent positives & negatives. A total of 10 systems reviewed and 

were otherwise negative.





Past Medical & Surgical


Medical Problems:


(1) Acute Lyme disease


(2) Back pain


(3) Chronic lymphocytic leukemia


(4) Coronary artery disease


(5) Degenerative disc disease


(6) Depression


(7) Epigastric abdominal pain


(8) Hypertension


(9) Hypothyroidism


(10) Rib pain


Surgical Problems:


(1) History of tonsillectomy








Family History





No pertinent family history





Social History


Smoking Status:  Never Smoker


Alcohol Use:  none


Drug Use:  none


Marital Status:  


Housing Status:  lives with significant other


Occupation Status:  retired





Current/Historical Medications


Scheduled


Aspirin (Aspirin), 81 MG PO QPM


Atorvastatin (Lipitor), 40 MG PO QPM


Azelastine Hcl (Astelin Nasal [place]), 1 SPRAY JULIO BID


Bimatoprost (Lumigan), 1 DROP OPB HS


Calcium Carbonate-Vitamin D (Calcium 600 + D), 1 TABS PO QPM


Cephalexin Monohydrate (Cephalexin), 500 MG PO TID


Cholecalciferol (Vitamin D3), 2 TABS PO DAILY


Ferrous Sulfate (Ferrous Sulfate), 325 MG PO BID


Fluticasone Propionate (Nasal) (Flonase Allergy Relief), 2 SPRAYS JULIO DAILY 

AFTERNOON


Gabapentin (Neurontin), 300 MG PO HS


Lactobacillus-Inulin (Culturelle), 1 CAP PO QPM


Lamotrigine (Lamictal), 100 MG PO BID


Levothyroxine Sodium (Levothyroxine Sodium), 75 MG PO DAILY


Lisinopril (Zestril), 10 MG PO QPM


Lorazepam (Ativan), 0.25 MG PO HS


Metoprolol Succinate (Toprol Xl), 25 MG PO QPM


Mometasone Furoate-Formoterol (Dulera 100/5 Mcg), 2 PUFFS INH Q12


Quetiapine Fumarate (Seroquel), 25 MG PO HS


Venlafaxine Hcl (Effexor Xr), 262.5 MG PO QAM


Venlafaxine Hcl (Effexor Xr), 150 MG PO DAILY


Venlafaxine Hcl (Effexor Xr), 37.5 MG PO DAILY





Scheduled PRN


Acetaminophen/Codeine (Tylenol W/Codeine #3), 1 TAB PO BID PRN for Pain


Epoetin Enmanuel (Procrit), 1 DOSE SC WK PRN for HEMOGLOBIN LEVEL < 12


Levalbuterol Tartrate (Levalbuterol Tartrate Hfa), 2 PUFFS INH Q4H PRN for PRN


Nitroglycerin (Nitrostat), 0.4 MG UT UD PRN for Chest Pain





Allergies


Coded Allergies:  


     Adhesives (Verified  Allergy, Intermediate, if on for a while-red blisters

, 11/19/17)


     Bacitracin (Verified  Allergy, Mild, ALLERGY-IRRITATION, 11/19/17)


     Neomycin (Verified  Allergy, Mild, ALLERGY, 11/19/17)


     Polymyxin B (Verified  Allergy, Mild, ALLERGY, 11/19/17)





Physical Exam


Vital Signs











  Date Time  Temp Pulse Resp B/P (MAP) Pulse Ox O2 Delivery O2 Flow Rate FiO2


 


11/22/17 17:21 36.3 99 16 154/76 96 Room Air  











Physical Exam


CONSTITUTIONAL/VITAL SIGNS: Reviewed / noted above.


GENERAL: Non-toxic in appearance. 


INTEGUMENTARY: Warm, dry, and Pink.


HEAD: Normocephalic.


EYES: without scleral icterus or trauma.


ENT/OROPHARYNX: clear and moist.


LYMPHADENOPATHY/NECK: Is supple without lymphadenopathy or meningismus.


RESPIRATORY: Lungs clear and equal.


CARDIOVASCULAR: Regular rate and rhythm.


GI/ABDOMEN: Soft and nontender. No organomegaly or pulsatile mass. No rebound 

or guarding. Normal bowel sounds.


EXTREMITIES: Warm and well perfused.


BACK: No CVA tenderness.


RECTAL: Black stool. Guaiac negative.


NEUROLOGICAL: Intact without focal deficits. 


PSYCHIATRIC: normal affect.


MUSCULOSKELETAL: Normally developed with good muscle tone.





Medical Decision & Procedures


ED Course


1726: Previous medical records were reviewed. The patient was evaluated in room 

B6. A complete history and physical examination was performed.





Medical Decision


Differential includes black stools related to iron, diarrhea related to 

antibiotics, GI bleed, C. difficile.





This is an 86-year-old female who was discharged from the hospital yesterday on 

Keflex for UTI.  She reported to her doctor today that she has had laxatives 

and has had some diarrhea since yesterday.  Patient was sent in for evaluation 

of this.  She does currently take iron.  The patient's exam was unremarkable.  

Stool is black and guaiac negative with a positive control.  The patient 

reports that she has a history of C. difficile and states that her diarrhea did 

not smell like it.  After evaluation of the patient, she was felt to be stable 

for discharge and outpatient follow-up.





Impression





 Primary Impression:  


 Black stools


 Additional Impression:  


 Diarrhea





Scribe Attestation


The scribe's documentation has been prepared under my direction and personally 

reviewed by me in its entirety. I confirm that the note above accurately 

reflects all work, treatment, procedures, and medical decision making performed 

by me.





Departure Information


Dispostion


Home / Self-Care





Referrals


Germán Do D.O. (PCP)





Patient Instructions


My [facility]





Additional Instructions





Continue to follow instructions provided yesterday.





Follow-up with your doctor as scheduled for next week if symptoms persist.





Return here for worsening or other concerns.





Problem Qualifiers Detail Level: Detailed Sunscreen Recommendations: SPF 30-45, broad spectrum Detail Level: Generalized